# Patient Record
Sex: FEMALE | Race: WHITE | URBAN - METROPOLITAN AREA
[De-identification: names, ages, dates, MRNs, and addresses within clinical notes are randomized per-mention and may not be internally consistent; named-entity substitution may affect disease eponyms.]

---

## 2017-04-04 ENCOUNTER — HOSPITAL ENCOUNTER (INPATIENT)
Facility: CLINIC | Age: 77
Setting detail: SURGERY ADMIT
End: 2017-04-04
Attending: ORTHOPAEDIC SURGERY | Admitting: ORTHOPAEDIC SURGERY
Payer: MEDICARE

## 2017-05-01 RX ORDER — ALBUTEROL SULFATE 90 UG/1
2 AEROSOL, METERED RESPIRATORY (INHALATION) EVERY 4 HOURS PRN
COMMUNITY

## 2017-05-02 ENCOUNTER — APPOINTMENT (OUTPATIENT)
Dept: GENERAL RADIOLOGY | Facility: CLINIC | Age: 77
DRG: 472 | End: 2017-05-02
Attending: ORTHOPAEDIC SURGERY
Payer: MEDICARE

## 2017-05-02 ENCOUNTER — ANESTHESIA EVENT (OUTPATIENT)
Dept: SURGERY | Facility: CLINIC | Age: 77
DRG: 472 | End: 2017-05-02
Payer: MEDICARE

## 2017-05-02 ENCOUNTER — ANESTHESIA (OUTPATIENT)
Dept: SURGERY | Facility: CLINIC | Age: 77
DRG: 472 | End: 2017-05-02
Payer: MEDICARE

## 2017-05-02 ENCOUNTER — HOSPITAL ENCOUNTER (INPATIENT)
Facility: CLINIC | Age: 77
LOS: 4 days | Discharge: NURSING, REHAB OR LONG-TERM CARE | DRG: 472 | End: 2017-05-06
Attending: ORTHOPAEDIC SURGERY | Admitting: ORTHOPAEDIC SURGERY
Payer: MEDICARE

## 2017-05-02 DIAGNOSIS — L40.9 PSORIASIS: ICD-10-CM

## 2017-05-02 DIAGNOSIS — M50.00 CERVICAL DISC DISEASE WITH MYELOPATHY: Primary | ICD-10-CM

## 2017-05-02 DIAGNOSIS — F41.1 GAD (GENERALIZED ANXIETY DISORDER): ICD-10-CM

## 2017-05-02 LAB
ABO + RH BLD: NORMAL
ABO + RH BLD: NORMAL
BLD GP AB SCN SERPL QL: NORMAL
BLOOD BANK CMNT PATIENT-IMP: NORMAL
DEPRECATED CALCIDIOL+CALCIFEROL SERPL-MC: 45 UG/L (ref 20–75)
GLUCOSE BLDC GLUCOMTR-MCNC: 139 MG/DL (ref 70–99)
GLUCOSE BLDC GLUCOMTR-MCNC: 159 MG/DL (ref 70–99)
GLUCOSE BLDC GLUCOMTR-MCNC: 176 MG/DL (ref 70–99)
GLUCOSE BLDC GLUCOMTR-MCNC: 178 MG/DL (ref 70–99)
GLUCOSE SERPL-MCNC: 164 MG/DL (ref 70–99)
POTASSIUM SERPL-SCNC: 4.2 MMOL/L (ref 3.4–5.3)
SPECIMEN EXP DATE BLD: NORMAL

## 2017-05-02 PROCEDURE — 0RG20A0 FUSION OF 2 OR MORE CERVICAL VERTEBRAL JOINTS WITH INTERBODY FUSION DEVICE, ANTERIOR APPROACH, ANTERIOR COLUMN, OPEN APPROACH: ICD-10-PCS | Performed by: ORTHOPAEDIC SURGERY

## 2017-05-02 PROCEDURE — 86900 BLOOD TYPING SEROLOGIC ABO: CPT | Performed by: ORTHOPAEDIC SURGERY

## 2017-05-02 PROCEDURE — 25000128 H RX IP 250 OP 636: Performed by: ANESTHESIOLOGY

## 2017-05-02 PROCEDURE — 36000069 ZZH SURGERY LEVEL 5 EA 15 ADDTL MIN: Performed by: ORTHOPAEDIC SURGERY

## 2017-05-02 PROCEDURE — 36000067 ZZH SURGERY LEVEL 5 1ST 30 MIN: Performed by: ORTHOPAEDIC SURGERY

## 2017-05-02 PROCEDURE — 25000128 H RX IP 250 OP 636: Performed by: NURSE ANESTHETIST, CERTIFIED REGISTERED

## 2017-05-02 PROCEDURE — 94660 CPAP INITIATION&MGMT: CPT

## 2017-05-02 PROCEDURE — 25000125 ZZHC RX 250: Performed by: NURSE ANESTHETIST, CERTIFIED REGISTERED

## 2017-05-02 PROCEDURE — 25800025 ZZH RX 258: Performed by: NURSE ANESTHETIST, CERTIFIED REGISTERED

## 2017-05-02 PROCEDURE — 00NW0ZZ RELEASE CERVICAL SPINAL CORD, OPEN APPROACH: ICD-10-PCS | Performed by: ORTHOPAEDIC SURGERY

## 2017-05-02 PROCEDURE — 40000275 ZZH STATISTIC RCP TIME EA 10 MIN

## 2017-05-02 PROCEDURE — 12000007 ZZH R&B INTERMEDIATE

## 2017-05-02 PROCEDURE — 25000125 ZZHC RX 250: Performed by: ANESTHESIOLOGY

## 2017-05-02 PROCEDURE — 27210995 ZZH RX 272: Performed by: ORTHOPAEDIC SURGERY

## 2017-05-02 PROCEDURE — 25000125 ZZHC RX 250: Performed by: ORTHOPAEDIC SURGERY

## 2017-05-02 PROCEDURE — A9270 NON-COVERED ITEM OR SERVICE: HCPCS | Mod: GY | Performed by: ORTHOPAEDIC SURGERY

## 2017-05-02 PROCEDURE — 25000566 ZZH SEVOFLURANE, EA 15 MIN: Performed by: ORTHOPAEDIC SURGERY

## 2017-05-02 PROCEDURE — 36415 COLL VENOUS BLD VENIPUNCTURE: CPT | Performed by: ORTHOPAEDIC SURGERY

## 2017-05-02 PROCEDURE — 25000128 H RX IP 250 OP 636: Performed by: ORTHOPAEDIC SURGERY

## 2017-05-02 PROCEDURE — 71000013 ZZH RECOVERY PHASE 1 LEVEL 1 EA ADDTL HR: Performed by: ORTHOPAEDIC SURGERY

## 2017-05-02 PROCEDURE — 27210794 ZZH OR GENERAL SUPPLY STERILE: Performed by: ORTHOPAEDIC SURGERY

## 2017-05-02 PROCEDURE — 37000008 ZZH ANESTHESIA TECHNICAL FEE, 1ST 30 MIN: Performed by: ORTHOPAEDIC SURGERY

## 2017-05-02 PROCEDURE — 37000009 ZZH ANESTHESIA TECHNICAL FEE, EACH ADDTL 15 MIN: Performed by: ORTHOPAEDIC SURGERY

## 2017-05-02 PROCEDURE — 82306 VITAMIN D 25 HYDROXY: CPT | Performed by: ORTHOPAEDIC SURGERY

## 2017-05-02 PROCEDURE — 40000985 XR CERVICAL SPINE PORT 1 VW

## 2017-05-02 PROCEDURE — 25800025 ZZH RX 258: Performed by: ANESTHESIOLOGY

## 2017-05-02 PROCEDURE — 84132 ASSAY OF SERUM POTASSIUM: CPT | Performed by: ORTHOPAEDIC SURGERY

## 2017-05-02 PROCEDURE — 82947 ASSAY GLUCOSE BLOOD QUANT: CPT | Performed by: ORTHOPAEDIC SURGERY

## 2017-05-02 PROCEDURE — 99207 ZZC CONSULT E&M CHANGED TO INITIAL LEVEL: CPT | Performed by: PHYSICIAN ASSISTANT

## 2017-05-02 PROCEDURE — 99223 1ST HOSP IP/OBS HIGH 75: CPT | Performed by: PHYSICIAN ASSISTANT

## 2017-05-02 PROCEDURE — C1713 ANCHOR/SCREW BN/BN,TIS/BN: HCPCS | Performed by: ORTHOPAEDIC SURGERY

## 2017-05-02 PROCEDURE — 25000132 ZZH RX MED GY IP 250 OP 250 PS 637: Mod: GY | Performed by: ORTHOPAEDIC SURGERY

## 2017-05-02 PROCEDURE — 27810169 ZZH OR IMPLANT GENERAL: Performed by: ORTHOPAEDIC SURGERY

## 2017-05-02 PROCEDURE — 00000146 ZZHCL STATISTIC GLUCOSE BY METER IP

## 2017-05-02 PROCEDURE — 71000012 ZZH RECOVERY PHASE 1 LEVEL 1 FIRST HR: Performed by: ORTHOPAEDIC SURGERY

## 2017-05-02 PROCEDURE — C9399 UNCLASSIFIED DRUGS OR BIOLOG: HCPCS | Performed by: NURSE ANESTHETIST, CERTIFIED REGISTERED

## 2017-05-02 PROCEDURE — 86901 BLOOD TYPING SEROLOGIC RH(D): CPT | Performed by: ORTHOPAEDIC SURGERY

## 2017-05-02 PROCEDURE — 40000170 ZZH STATISTIC PRE-PROCEDURE ASSESSMENT II: Performed by: ORTHOPAEDIC SURGERY

## 2017-05-02 PROCEDURE — 86850 RBC ANTIBODY SCREEN: CPT | Performed by: ORTHOPAEDIC SURGERY

## 2017-05-02 DEVICE — IMPLANTABLE DEVICE: Type: IMPLANTABLE DEVICE | Site: SPINE CERVICAL | Status: FUNCTIONAL

## 2017-05-02 RX ORDER — SODIUM CHLORIDE, SODIUM LACTATE, POTASSIUM CHLORIDE, CALCIUM CHLORIDE 600; 310; 30; 20 MG/100ML; MG/100ML; MG/100ML; MG/100ML
INJECTION, SOLUTION INTRAVENOUS CONTINUOUS
Status: DISCONTINUED | OUTPATIENT
Start: 2017-05-02 | End: 2017-05-02 | Stop reason: HOSPADM

## 2017-05-02 RX ORDER — ALBUTEROL SULFATE 90 UG/1
2 AEROSOL, METERED RESPIRATORY (INHALATION) EVERY 4 HOURS PRN
Status: DISCONTINUED | OUTPATIENT
Start: 2017-05-02 | End: 2017-05-06 | Stop reason: HOSPADM

## 2017-05-02 RX ORDER — POLYETHYLENE GLYCOL 3350 17 G/17G
17 POWDER, FOR SOLUTION ORAL DAILY
Status: DISCONTINUED | OUTPATIENT
Start: 2017-05-02 | End: 2017-05-06 | Stop reason: HOSPADM

## 2017-05-02 RX ORDER — DEXTROSE MONOHYDRATE 25 G/50ML
25-50 INJECTION, SOLUTION INTRAVENOUS
Status: DISCONTINUED | OUTPATIENT
Start: 2017-05-02 | End: 2017-05-06 | Stop reason: HOSPADM

## 2017-05-02 RX ORDER — FENTANYL CITRATE 50 UG/ML
INJECTION, SOLUTION INTRAMUSCULAR; INTRAVENOUS PRN
Status: DISCONTINUED | OUTPATIENT
Start: 2017-05-02 | End: 2017-05-02

## 2017-05-02 RX ORDER — ONDANSETRON 2 MG/ML
4 INJECTION INTRAMUSCULAR; INTRAVENOUS EVERY 6 HOURS PRN
Status: DISCONTINUED | OUTPATIENT
Start: 2017-05-02 | End: 2017-05-06 | Stop reason: HOSPADM

## 2017-05-02 RX ORDER — CEFAZOLIN SODIUM 2 G/100ML
2 INJECTION, SOLUTION INTRAVENOUS
Status: COMPLETED | OUTPATIENT
Start: 2017-05-02 | End: 2017-05-02

## 2017-05-02 RX ORDER — POTASSIUM CHLORIDE 1500 MG/1
20 TABLET, EXTENDED RELEASE ORAL 2 TIMES DAILY WITH MEALS
Status: DISCONTINUED | OUTPATIENT
Start: 2017-05-02 | End: 2017-05-06 | Stop reason: HOSPADM

## 2017-05-02 RX ORDER — NICOTINE POLACRILEX 4 MG
15-30 LOZENGE BUCCAL
Status: DISCONTINUED | OUTPATIENT
Start: 2017-05-02 | End: 2017-05-06 | Stop reason: HOSPADM

## 2017-05-02 RX ORDER — METOPROLOL TARTRATE 25 MG/1
25 TABLET, FILM COATED ORAL 2 TIMES DAILY
Status: DISCONTINUED | OUTPATIENT
Start: 2017-05-02 | End: 2017-05-06 | Stop reason: HOSPADM

## 2017-05-02 RX ORDER — PRAMIPEXOLE DIHYDROCHLORIDE 0.25 MG/1
0.5 TABLET ORAL
Status: DISCONTINUED | OUTPATIENT
Start: 2017-05-02 | End: 2017-05-06 | Stop reason: HOSPADM

## 2017-05-02 RX ORDER — HYDRALAZINE HYDROCHLORIDE 50 MG/1
100 TABLET, FILM COATED ORAL 3 TIMES DAILY
Status: DISCONTINUED | OUTPATIENT
Start: 2017-05-02 | End: 2017-05-06 | Stop reason: HOSPADM

## 2017-05-02 RX ORDER — NEOSTIGMINE METHYLSULFATE 1 MG/ML
VIAL (ML) INJECTION PRN
Status: DISCONTINUED | OUTPATIENT
Start: 2017-05-02 | End: 2017-05-02

## 2017-05-02 RX ORDER — ONDANSETRON 4 MG/1
4 TABLET, ORALLY DISINTEGRATING ORAL EVERY 6 HOURS PRN
Status: DISCONTINUED | OUTPATIENT
Start: 2017-05-02 | End: 2017-05-06 | Stop reason: HOSPADM

## 2017-05-02 RX ORDER — HYDROMORPHONE HYDROCHLORIDE 1 MG/ML
.3-.5 INJECTION, SOLUTION INTRAMUSCULAR; INTRAVENOUS; SUBCUTANEOUS EVERY 5 MIN PRN
Status: DISCONTINUED | OUTPATIENT
Start: 2017-05-02 | End: 2017-05-02 | Stop reason: HOSPADM

## 2017-05-02 RX ORDER — LEVOTHYROXINE SODIUM 100 UG/1
100 TABLET ORAL DAILY
Status: DISCONTINUED | OUTPATIENT
Start: 2017-05-03 | End: 2017-05-06 | Stop reason: HOSPADM

## 2017-05-02 RX ORDER — TORSEMIDE 10 MG/1
40 TABLET ORAL
Status: DISCONTINUED | OUTPATIENT
Start: 2017-05-02 | End: 2017-05-05

## 2017-05-02 RX ORDER — PROPOFOL 10 MG/ML
INJECTION, EMULSION INTRAVENOUS PRN
Status: DISCONTINUED | OUTPATIENT
Start: 2017-05-02 | End: 2017-05-02

## 2017-05-02 RX ORDER — FEXOFENADINE HCL 180 MG/1
180 TABLET ORAL DAILY
Status: DISCONTINUED | OUTPATIENT
Start: 2017-05-02 | End: 2017-05-06 | Stop reason: HOSPADM

## 2017-05-02 RX ORDER — GLYCOPYRROLATE 0.2 MG/ML
INJECTION, SOLUTION INTRAMUSCULAR; INTRAVENOUS PRN
Status: DISCONTINUED | OUTPATIENT
Start: 2017-05-02 | End: 2017-05-02

## 2017-05-02 RX ORDER — GABAPENTIN 300 MG/1
300 CAPSULE ORAL ONCE
Status: DISCONTINUED | OUTPATIENT
Start: 2017-05-02 | End: 2017-05-02 | Stop reason: HOSPADM

## 2017-05-02 RX ORDER — ONDANSETRON 2 MG/ML
INJECTION INTRAMUSCULAR; INTRAVENOUS PRN
Status: DISCONTINUED | OUTPATIENT
Start: 2017-05-02 | End: 2017-05-02

## 2017-05-02 RX ORDER — ONDANSETRON 2 MG/ML
4 INJECTION INTRAMUSCULAR; INTRAVENOUS EVERY 30 MIN PRN
Status: DISCONTINUED | OUTPATIENT
Start: 2017-05-02 | End: 2017-05-02 | Stop reason: HOSPADM

## 2017-05-02 RX ORDER — FENTANYL CITRATE 50 UG/ML
50-100 INJECTION, SOLUTION INTRAMUSCULAR; INTRAVENOUS
Status: DISCONTINUED | OUTPATIENT
Start: 2017-05-02 | End: 2017-05-02 | Stop reason: HOSPADM

## 2017-05-02 RX ORDER — SODIUM CHLORIDE, SODIUM LACTATE, POTASSIUM CHLORIDE, CALCIUM CHLORIDE 600; 310; 30; 20 MG/100ML; MG/100ML; MG/100ML; MG/100ML
INJECTION, SOLUTION INTRAVENOUS CONTINUOUS PRN
Status: DISCONTINUED | OUTPATIENT
Start: 2017-05-02 | End: 2017-05-02

## 2017-05-02 RX ORDER — PRAVASTATIN SODIUM 20 MG
20 TABLET ORAL AT BEDTIME
Status: DISCONTINUED | OUTPATIENT
Start: 2017-05-02 | End: 2017-05-06 | Stop reason: HOSPADM

## 2017-05-02 RX ORDER — NALOXONE HYDROCHLORIDE 0.4 MG/ML
.1-.4 INJECTION, SOLUTION INTRAMUSCULAR; INTRAVENOUS; SUBCUTANEOUS
Status: DISCONTINUED | OUTPATIENT
Start: 2017-05-02 | End: 2017-05-06 | Stop reason: HOSPADM

## 2017-05-02 RX ORDER — IPRATROPIUM BROMIDE AND ALBUTEROL SULFATE 2.5; .5 MG/3ML; MG/3ML
3 SOLUTION RESPIRATORY (INHALATION) ONCE
Status: COMPLETED | OUTPATIENT
Start: 2017-05-02 | End: 2017-05-02

## 2017-05-02 RX ORDER — CEFAZOLIN SODIUM 1 G/3ML
1 INJECTION, POWDER, FOR SOLUTION INTRAMUSCULAR; INTRAVENOUS SEE ADMIN INSTRUCTIONS
Status: DISCONTINUED | OUTPATIENT
Start: 2017-05-02 | End: 2017-05-02 | Stop reason: HOSPADM

## 2017-05-02 RX ORDER — EPHEDRINE SULFATE 50 MG/ML
INJECTION, SOLUTION INTRAMUSCULAR; INTRAVENOUS; SUBCUTANEOUS PRN
Status: DISCONTINUED | OUTPATIENT
Start: 2017-05-02 | End: 2017-05-02

## 2017-05-02 RX ORDER — HYDROMORPHONE HYDROCHLORIDE 1 MG/ML
.3-.5 INJECTION, SOLUTION INTRAMUSCULAR; INTRAVENOUS; SUBCUTANEOUS
Status: DISCONTINUED | OUTPATIENT
Start: 2017-05-02 | End: 2017-05-03

## 2017-05-02 RX ORDER — HYDROMORPHONE HYDROCHLORIDE 1 MG/ML
INJECTION, SOLUTION INTRAMUSCULAR; INTRAVENOUS; SUBCUTANEOUS
Status: DISCONTINUED
Start: 2017-05-02 | End: 2017-05-02

## 2017-05-02 RX ORDER — LIDOCAINE HYDROCHLORIDE 20 MG/ML
INJECTION, SOLUTION INFILTRATION; PERINEURAL PRN
Status: DISCONTINUED | OUTPATIENT
Start: 2017-05-02 | End: 2017-05-02

## 2017-05-02 RX ORDER — ONDANSETRON 2 MG/ML
4 INJECTION INTRAMUSCULAR; INTRAVENOUS EVERY 30 MIN PRN
Status: DISCONTINUED | OUTPATIENT
Start: 2017-05-02 | End: 2017-05-02

## 2017-05-02 RX ORDER — LIDOCAINE 40 MG/G
CREAM TOPICAL
Status: DISCONTINUED | OUTPATIENT
Start: 2017-05-02 | End: 2017-05-06 | Stop reason: HOSPADM

## 2017-05-02 RX ORDER — VECURONIUM BROMIDE 1 MG/ML
INJECTION, POWDER, LYOPHILIZED, FOR SOLUTION INTRAVENOUS PRN
Status: DISCONTINUED | OUTPATIENT
Start: 2017-05-02 | End: 2017-05-02

## 2017-05-02 RX ORDER — ACETAMINOPHEN 325 MG/1
975 TABLET ORAL ONCE
Status: DISCONTINUED | OUTPATIENT
Start: 2017-05-02 | End: 2017-05-02 | Stop reason: HOSPADM

## 2017-05-02 RX ORDER — LORAZEPAM 0.5 MG/1
0.5 TABLET ORAL
Status: DISCONTINUED | OUTPATIENT
Start: 2017-05-02 | End: 2017-05-06 | Stop reason: HOSPADM

## 2017-05-02 RX ORDER — FENTANYL CITRATE 50 UG/ML
25-50 INJECTION, SOLUTION INTRAMUSCULAR; INTRAVENOUS
Status: DISCONTINUED | OUTPATIENT
Start: 2017-05-02 | End: 2017-05-02 | Stop reason: HOSPADM

## 2017-05-02 RX ORDER — SODIUM CHLORIDE 9 MG/ML
INJECTION, SOLUTION INTRAVENOUS CONTINUOUS
Status: DISCONTINUED | OUTPATIENT
Start: 2017-05-02 | End: 2017-05-05

## 2017-05-02 RX ORDER — CEFAZOLIN SODIUM 2 G/100ML
2 INJECTION, SOLUTION INTRAVENOUS EVERY 8 HOURS
Status: COMPLETED | OUTPATIENT
Start: 2017-05-02 | End: 2017-05-03

## 2017-05-02 RX ORDER — ONDANSETRON 4 MG/1
4 TABLET, ORALLY DISINTEGRATING ORAL EVERY 30 MIN PRN
Status: DISCONTINUED | OUTPATIENT
Start: 2017-05-02 | End: 2017-05-02 | Stop reason: HOSPADM

## 2017-05-02 RX ADMIN — IPRATROPIUM BROMIDE AND ALBUTEROL SULFATE 3 ML: .5; 3 SOLUTION RESPIRATORY (INHALATION) at 14:02

## 2017-05-02 RX ADMIN — MIDAZOLAM HYDROCHLORIDE 1 MG: 1 INJECTION, SOLUTION INTRAMUSCULAR; INTRAVENOUS at 08:45

## 2017-05-02 RX ADMIN — HYDROMORPHONE HYDROCHLORIDE 0.5 MG: 1 INJECTION, SOLUTION INTRAMUSCULAR; INTRAVENOUS; SUBCUTANEOUS at 19:41

## 2017-05-02 RX ADMIN — LIDOCAINE HYDROCHLORIDE 100 MG: 20 INJECTION, SOLUTION INFILTRATION; PERINEURAL at 08:54

## 2017-05-02 RX ADMIN — VECURONIUM BROMIDE 1 MG: 1 INJECTION, POWDER, LYOPHILIZED, FOR SOLUTION INTRAVENOUS at 10:24

## 2017-05-02 RX ADMIN — SODIUM CHLORIDE: 9 INJECTION, SOLUTION INTRAVENOUS at 18:36

## 2017-05-02 RX ADMIN — HYDROMORPHONE HYDROCHLORIDE 0.5 MG: 1 INJECTION, SOLUTION INTRAMUSCULAR; INTRAVENOUS; SUBCUTANEOUS at 13:10

## 2017-05-02 RX ADMIN — FENTANYL CITRATE 50 MCG: 50 INJECTION, SOLUTION INTRAMUSCULAR; INTRAVENOUS at 10:29

## 2017-05-02 RX ADMIN — PROPOFOL 200 MG: 10 INJECTION, EMULSION INTRAVENOUS at 08:54

## 2017-05-02 RX ADMIN — LORAZEPAM 0.5 MG: 0.5 TABLET ORAL at 21:23

## 2017-05-02 RX ADMIN — HYDRALAZINE HYDROCHLORIDE 100 MG: 50 TABLET ORAL at 21:23

## 2017-05-02 RX ADMIN — ONDANSETRON 4 MG: 2 INJECTION INTRAMUSCULAR; INTRAVENOUS at 12:19

## 2017-05-02 RX ADMIN — NEOSTIGMINE METHYLSULFATE 5 MG: 1 INJECTION INTRAMUSCULAR; INTRAVENOUS; SUBCUTANEOUS at 12:18

## 2017-05-02 RX ADMIN — CEFAZOLIN SODIUM 2 G: 2 INJECTION, SOLUTION INTRAVENOUS at 09:06

## 2017-05-02 RX ADMIN — SODIUM CHLORIDE, POTASSIUM CHLORIDE, SODIUM LACTATE AND CALCIUM CHLORIDE: 600; 310; 30; 20 INJECTION, SOLUTION INTRAVENOUS at 08:52

## 2017-05-02 RX ADMIN — GLYCOPYRROLATE 0.8 MG: 0.2 INJECTION, SOLUTION INTRAMUSCULAR; INTRAVENOUS at 12:18

## 2017-05-02 RX ADMIN — FENTANYL CITRATE 50 MCG: 50 INJECTION, SOLUTION INTRAMUSCULAR; INTRAVENOUS at 08:46

## 2017-05-02 RX ADMIN — VECURONIUM BROMIDE 2 MG: 1 INJECTION, POWDER, LYOPHILIZED, FOR SOLUTION INTRAVENOUS at 09:00

## 2017-05-02 RX ADMIN — VECURONIUM BROMIDE 2 MG: 1 INJECTION, POWDER, LYOPHILIZED, FOR SOLUTION INTRAVENOUS at 11:37

## 2017-05-02 RX ADMIN — MIDAZOLAM HYDROCHLORIDE 1 MG: 1 INJECTION, SOLUTION INTRAMUSCULAR; INTRAVENOUS at 08:41

## 2017-05-02 RX ADMIN — Medication 10 MG: at 09:09

## 2017-05-02 RX ADMIN — FENTANYL CITRATE 50 MCG: 50 INJECTION, SOLUTION INTRAMUSCULAR; INTRAVENOUS at 09:15

## 2017-05-02 RX ADMIN — VECURONIUM BROMIDE 2 MG: 1 INJECTION, POWDER, LYOPHILIZED, FOR SOLUTION INTRAVENOUS at 09:58

## 2017-05-02 RX ADMIN — FENTANYL CITRATE 50 MCG: 50 INJECTION, SOLUTION INTRAMUSCULAR; INTRAVENOUS at 08:42

## 2017-05-02 RX ADMIN — HYDROMORPHONE HYDROCHLORIDE 0.5 MG: 1 INJECTION, SOLUTION INTRAMUSCULAR; INTRAVENOUS; SUBCUTANEOUS at 17:11

## 2017-05-02 RX ADMIN — Medication 5 MG: at 12:11

## 2017-05-02 RX ADMIN — VECURONIUM BROMIDE 1 MG: 1 INJECTION, POWDER, LYOPHILIZED, FOR SOLUTION INTRAVENOUS at 10:56

## 2017-05-02 RX ADMIN — FENTANYL CITRATE 25 MCG: 50 INJECTION, SOLUTION INTRAMUSCULAR; INTRAVENOUS at 12:29

## 2017-05-02 RX ADMIN — SODIUM CHLORIDE, POTASSIUM CHLORIDE, SODIUM LACTATE AND CALCIUM CHLORIDE: 600; 310; 30; 20 INJECTION, SOLUTION INTRAVENOUS at 11:04

## 2017-05-02 RX ADMIN — ROCURONIUM BROMIDE 50 MG: 10 INJECTION INTRAVENOUS at 08:54

## 2017-05-02 RX ADMIN — CEFAZOLIN SODIUM 2 G: 2 INJECTION, SOLUTION INTRAVENOUS at 18:46

## 2017-05-02 RX ADMIN — Medication 5 MG: at 09:02

## 2017-05-02 RX ADMIN — HYDROMORPHONE HYDROCHLORIDE 0.5 MG: 1 INJECTION, SOLUTION INTRAMUSCULAR; INTRAVENOUS; SUBCUTANEOUS at 22:07

## 2017-05-02 RX ADMIN — SUGAMMADEX 200 MG: 100 INJECTION, SOLUTION INTRAVENOUS at 12:37

## 2017-05-02 RX ADMIN — SODIUM CHLORIDE, POTASSIUM CHLORIDE, SODIUM LACTATE AND CALCIUM CHLORIDE: 600; 310; 30; 20 INJECTION, SOLUTION INTRAVENOUS at 07:44

## 2017-05-02 NOTE — IP AVS SNAPSHOT
MRN:[id number]                      After Visit Summary   5/2/2017    Nevaeh Lopes    MRN: [id number]           Thank you!     Thank you for choosing [facility] for your care. Our goal is always to provide you with excellent care. Hearing back from our patients is one way we can continue to improve our services. Please take a few minutes to complete the written survey that you may receive in the mail after you visit with us. Thank you!        Patient Information     Date Of Birth          1940        Designated Caregiver       Most Recent Value    Caregiver    Will someone help with your care after discharge? no      About your hospital stay     You were admitted on:  May 2, 2017 You last received care in the:  [facility] [facility] [address] [facility]    You were discharged on:  May 6, 2017        Reason for your hospital stay       See discharge summary                  Who to Call     For medical emergencies, please call 911.  For non-urgent questions about your medical care, please call your primary care provider or clinic, [phone]  For questions related to your surgery, please call your surgery clinic        Attending Provider     Provider Specialty    Rambo Martin MD Orthopedics       Primary Care Provider Office Phone # Fax #    Bartolome Clark [phone]       [place] [address]        After Care Instructions     Activity - Ambulate in hallway       Every shift            Activity - Up with assistive device           Activity - Up with nursing assistance           Additional Discharge Instructions       Encourage use of her home CPAP machine with sleep.            Advance Diet as Tolerated       Snacks/Supplements Adult: Ensure Plus Adult Shake; Between Meals   Combination Diet Dysphagia Diet Level 2: Mechan Altered; Nectar Thickened Liquids (pre-thickened or use instant food thickener)            Fall  precautions           Follow Up (Transitional Care Management)       Follow up with primary care provider, Bartolome Clark, or TCU provider within 7 days for hospital follow- up.  The following labs/tests are recommended: BMP.  Echocardiogram recommended after discharge from TCU, prior to follow up with her primary care provider.            General info for SNF       Length of Stay Estimate: Short Term Care: Estimated # of Days <30  Condition at Discharge: Improving  Level of care:skilled   Rehabilitation Potential: Good  Admission H&P remains valid and up-to-date: Yes  Recent Chemotherapy: N/A  Use Nursing Home Standing Orders: Yes            Glucose monitor nursing POCT       Before meals and at bedtime            Mantoux instructions       Give two-step Mantoux (PPD) Per Facility Policy Yes            Wound care (specify)       Site:   Anterior neck  Instructions:  Keep wound dry for two more days. May remove steri strips in one week.                  Additional Services     Occupational Therapy Adult Consult       Evaluate and treat as clinically indicated.    Reason:  adls            Physical Therapy Adult Consult       Evaluate and treat as clinically indicated.    Reason:  Post op anterior cervical fusion for myelopathy. No neck exercises. rom of bilateral ues.            Speech Language Path Adult Consult       Evaluate and treat as clinically indicated.    Reason:  Anterior cervical surgery.                  Further instructions from your care team       [facility] at [facility]5 via son.   Encourage use of her CPAP machine at night.     INSTRUCTIONS FOR CERVICAL SURGICAL PATIENTS  KIMBERLY MARTIN MD--[facility]    POSTOPERATIVE INSTRUCTIONS (AFTER SURGERY):     PATIENTS WITH A SOFT COLLAR:    1. The collar must be worn at all times for the 6 weeks after your surgery until your first follow up appointment with Dr. Martin. You may only remove it to shower however, be sure to  avoid any excessive movement of your head and neck.      PATIENTS WITH A CUSTOM NECK BRACE:  1. The brace must be worn at all times for 3 months after surgery. In most cases you can remove it to shower. Dr. Martin will tell you if you cannot.     2. Check the straps on the brace daily to make sure the lines drawn on the straps are where they belong and the brace is fitting properly.    3. Regarding any questions, concerns or needed adjustments with the brace please call [facility], [phone], and speak directly to the orthotist that fit the brace. If you are having difficulty reaching them please call Sue at [phone].    4. This brace can be cumbersome to wear we encourage you to practice wearing it with all activities BEFORE your surgery. This will allow you to get used to how it will feel and make any modifications at home or work if need be.     INCISION/WOUND CARE                1.   If you are discharged from the hospital with a dressing over your incision you may remove and replace it 2 days after leaving the hospital.     2.   If you have sutures that dissolve, the incision must be covered when showering for 5 days after surgery. On the 6th day, you may take a shower normally without covering the wound. But do not scrub the wound.     3.  The small pieces of tape over your incision are called Steri-strips, they can be   removed when they are loose or after 2 weeks.                                                                           4.    If you have staples, your incision must remain dry and covered until they are removed 2 weeks after your surgery. Please call Dr. Mario Rogers s Care Coordinator at [phone] to make an appointment to have these removed when you have returned home from the hospital.    5. Please look at your incisions daily and call [phone] immediately if you notice any redness, swelling, drainage, or if you develop a fever greater than 101.  If after hours or weekends call [phone] and speak to the on-call physician.    6. Absolutely no bathtubs, hot tubs, swimming in pools or lakes, or any submersion/soaking before the incision is completely healed (no open areas or scabs are present).    7. Due to the location of the incision and surgery you may experience swelling that can alter your swallowing if you are having any difficulty at all please contact us immediately [phone] or after hours or weekends call [phone] and speak to the on-call physician.    POST OPERATIVE ACTIVITY LIMITATIONS    1. Avoid extreme motions of your head and neck which will be limited/controlled by the collar. Prior to surgery we encourage you to stock straws for drinking and soft foods to eat post-operatively as eating/drinking will be temporarily effected by both post surgical swelling and the collar.     2. No lifting over 10 pounds (gallon of milk) above your chest or below your waist.     3. Avoid repetitive twisting or bending i.e. raking, sweeping, shoveling etc.    4. Walking stimulates the healing process. Dr. Martin wants you to accomplish a minimum of 45 minutes of sustained walking per day for exercise. You are encouraged to walk several times a day and there is no limit on how far you can walk. In the beginning you may only be able to walk 5-15 minutes at a time that is okay just do this a minimum of 4-10x/day.    5. You may remove your white stockings (todd hose) for   hour twice a day. You may discontinue wearing the stockings when you are not spending any time in bed during the day and are walking at least hourly.    6. You may begin driving again when you are no longer taking the narcotic pain medication and feel comfortable with being able to navigate amongst other drivers. You must also wear the collar that you have been provided while driving.    7. You may return to work when you are no longer taking the narcotic pain medication. You must observe  the 10-pound lifting restriction (nothing below waist or above chest), frequent change of position from sit, stand, and walking and avoid repetitive bending and twisting. You must also wear the collar that you have been provided.    8. Advancement of physical activities will be discussed at each follow up appointment with Dr. Martin. Physical therapy, if needed, will also be discussed at each appointment.    9. Gentle sexual activity is okay. Be a passive participant.    POST OPERATIVE MEDICATIONS    1. If your surgery INCLUDED A FUSION  DO NOT take Ibuprofen, Motrin, Advil, Aleve or any other anti-inflammatory medication or aspirin products for 3 months after surgery. This also includes any medication taken for chronic inflammatory conditions i.e. Methotrexate, Remicaid, Prednisone etc. unless otherwise instructed. Only Tylenol or Tylenol based medications are okay during this time frame.    2. If your surgery DID NOT include a fusion you may resume any over-the-counter     anti-inflammatories (Advil, Ibuprofen, Naproxen etc.) 3-5 days after the surgery.    3. We recommend three 8 ounce glasses of milk daily and a daily supplement of Vitamin D 2000 i.u./day for fusion healing and bone growth.    4. Poor nutrition can lead to delayed wound healing and constipation. Good sources of lean proteins and fresh fruits and vegetables will help with this.     5. Narcotic pain medications will also cause constipation. Using over the counter stool softeners, drinking plenty of fluids, ambulation, and good nutrition can help prevent this from occurring.     If you have any questions regarding these instructions please contact Dr. Martin at   [phone].        Pending Results     No orders found from 4/30/2017 to 5/3/2017.            Statement of Approval     Ordered          05/06/17 1233  I have reviewed and agree with all the recommendations and orders detailed in this document.  EFFECTIVE NOW     Approved and electronically  "signed by:  Amy Terrell MD           17 1217  I have reviewed and agree with all the recommendations and orders detailed in this document.  EFFECTIVE NOW     Approved and electronically signed by:  Amy Terrell MD           17 0818  I have reviewed and agree with all the recommendations and orders detailed in this document.  EFFECTIVE NOW     Approved and electronically signed by:  Rambo Martin MD             Admission Information     Date & Time Provider Department Dept. Phone    2017 Rambo Martin MD [place] [address] [facility] [phone]      Your Vitals Were     Blood Pressure Temperature Respirations Height Weight Pulse Oximetry    156/55 (BP Location: Left arm) 98.3  F (36.8  C) (Oral) 16 1.6 m (5' 3\") 95.4 kg (210 lb 5.1 oz) 96%    BMI (Body Mass Index)                   37.26 kg/m2           MyChart Information     [employer] lets you send messages to your doctor, view your test results, renew your prescriptions, schedule appointments and more. To sign up, go to [email or web address] . Click on \"Log in\" on the left side of the screen, which will take you to the Welcome page. Then click on \"Sign up Now\" on the right side of the page.     You will be asked to enter the access code listed below, as well as some personal information. Please follow the directions to create your username and password.     Your access code is: [email or web address]  Expires: 8/3/2017  8:59 AM     Your access code will  in 90 days. If you need help or a new code, please call your [facility] clinic or [phone].        Care EveryWhere ID     This is your Care EveryWhere ID. This could be used by other organizations to access your [facility] medical records  [id number]           Review of your medicines      START taking        Dose / Directions    clobetasol 0.05 % cream   Commonly known as:  TEMOVATE   Used for:  Psoriasis        Apply topically 2 times daily   Refills:  0    "    HYDROmorphone 2 MG tablet   Commonly known as:  DILAUDID        Dose:  1-2 mg   Take 0.5-1 tablets (1-2 mg) by mouth every 4 hours as needed for moderate to severe pain   Quantity:  30 tablet   Refills:  0         CONTINUE these medicines which may have CHANGED, or have new prescriptions. If we are uncertain of the size of tablets/capsules you have at home, strength may be listed as something that might have changed.        Dose / Directions    * ATIVAN PO   This may have changed:  Another medication with the same name was changed. Make sure you understand how and when to take each.        Dose:  0.5 mg   Take 0.5 mg by mouth nightly as needed for anxiety   Refills:  0       * LORazepam 0.5 MG tablet   Commonly known as:  ATIVAN   This may have changed:    - medication strength  - when to take this  - reasons to take this   Used for:  JOSE (generalized anxiety disorder)        Dose:  0.5 mg   Take 1 tablet (0.5 mg) by mouth daily as needed   Quantity:  20 tablet   Refills:  0       * Notice:  This list has 2 medication(s) that are the same as other medications prescribed for you. Read the directions carefully, and ask your doctor or other care provider to review them with you.      CONTINUE these medicines which have NOT CHANGED        Dose / Directions    albuterol 108 (90 BASE) MCG/ACT Inhaler   Commonly known as:  PROAIR HFA/PROVENTIL HFA/VENTOLIN HFA        Dose:  2 puff   Inhale 2 puffs into the lungs every 4 hours as needed for shortness of breath / dyspnea or wheezing   Refills:  0       ALLEGRA PO        Dose:  180 mg   Take 180 mg by mouth daily   Refills:  0       ASPIRIN PO        Dose:  325 mg   Take 325 mg by mouth At Bedtime   Refills:  0       HYDRALAZINE HCL PO        Dose:  100 mg   Take 100 mg by mouth 3 times daily   Refills:  0       IMODIUM A-D PO        Dose:  2 mg   Take 2 mg by mouth every 4 hours as needed   Refills:  0       insulin glargine 100 UNIT/ML injection   Commonly known as:   LANTUS        Dose:  35 Units   Inject 35 Units Subcutaneous every morning   Refills:  0       K-DUR PO        Dose:  20 mEq   Take 20 mEq by mouth 2 times daily (Takes 2 x 10mEq = 20mEq dose)   Refills:  0       LEVOTHYROXINE SODIUM PO        Dose:  100 mcg   Take 100 mcg by mouth daily   Refills:  0       MELATONIN PO        Dose:  10 mg   Take 10 mg by mouth At Bedtime (takes 2 x 5mg tablet = 10mg dose)   Refills:  0       METOPROLOL TARTRATE PO        Dose:  25 mg   Take 25 mg by mouth 2 times daily   Refills:  0       MIRALAX PO        Dose:  17 g   Take 17 g by mouth daily as needed   Refills:  0       MIRAPEX PO        Dose:  0.5 mg   Take 0.5 mg by mouth nightly as needed (restless legs)   Refills:  0       NONFORMULARY        Dose:  1-2 capsule   Take 1-2 capsules by mouth 3 times daily as needed (Irritable Bowel Syndrome) OTC: IBgard   Refills:  0       NovoLOG FLEXPEN 100 UNIT/ML injection   Generic drug:  insulin aspart        Dose:  7-12 Units   Inject 7-12 Units Subcutaneous 3 times daily (with meals) If BG is over 170 - sliding scale   Refills:  0       OMEPRAZOLE PO        Dose:  20 mg   Take 20 mg by mouth daily   Refills:  0       PRAVASTATIN SODIUM PO        Dose:  20 mg   Take 20 mg by mouth At Bedtime   Refills:  0       * PROZAC PO        Dose:  40 mg   Take 40 mg by mouth every other day On Even Days   Refills:  0       * PROZAC PO        Dose:  80 mg   Take 80 mg by mouth every other day On Odd Days (Takes 2 x 40mg tablet = 80mg dose)   Refills:  0       TORSEMIDE PO        Dose:  40 mg   Take 40 mg by mouth 2 times daily (Takes 2 x 20mg tablet = 40mg dose)   Refills:  0       * TYLENOL PO        Dose:  1300 mg   Take 1,300 mg by mouth daily as needed for mild pain or fever   Refills:  0       * TYLENOL PO        Dose:  1300 mg   Take 1,300 mg by mouth 2 times daily   Refills:  0       VITAMIN D (CHOLECALCIFEROL) PO        Dose:  5000 Units   Take 5,000 Units by mouth daily (takes 5 x 1000  unit tablet = 5000unit dose)   Refills:  0       * Notice:  This list has 4 medication(s) that are the same as other medications prescribed for you. Read the directions carefully, and ask your doctor or other care provider to review them with you.      STOP taking     acetaminophen-codeine 300-30 MG per tablet   Commonly known as:  TYLENOL #3                Where to get your medicines      Some of these will need a paper prescription and others can be bought over the counter. Ask your nurse if you have questions.     Bring a paper prescription for each of these medications     HYDROmorphone 2 MG tablet       You don't need a prescription for these medications     clobetasol 0.05 % cream         Information about where to get these medications is not yet available     ! Ask your nurse or doctor about these medications     LORazepam 0.5 MG tablet                Protect others around you: Learn how to safely use, store and throw away your medicines at www.disposemymeds.org.             Medication List: This is a list of all your medications and when to take them. Check marks below indicate your daily home schedule. Keep this list as a reference.      Medications           Morning Afternoon Evening Bedtime As Needed    albuterol 108 (90 BASE) MCG/ACT Inhaler   Commonly known as:  PROAIR HFA/PROVENTIL HFA/VENTOLIN HFA   Inhale 2 puffs into the lungs every 4 hours as needed for shortness of breath / dyspnea or wheezing                                ALLEGRA PO   Take 180 mg by mouth daily   Last time this was given:  180 mg on 5/6/2017  9:29 AM                                ASPIRIN PO   Take 325 mg by mouth At Bedtime                                * ATIVAN PO   Take 0.5 mg by mouth nightly as needed for anxiety   Last time this was given:  0.5 mg on 5/6/2017  5:24 AM                                * LORazepam 0.5 MG tablet   Commonly known as:  ATIVAN   Take 1 tablet (0.5 mg) by mouth daily as needed   Last time this  was given:  0.5 mg on 5/6/2017  5:24 AM                                clobetasol 0.05 % cream   Commonly known as:  TEMOVATE   Apply topically 2 times daily                                HYDRALAZINE HCL PO   Take 100 mg by mouth 3 times daily   Last time this was given:  100 mg on 5/6/2017  3:49 PM                                HYDROmorphone 2 MG tablet   Commonly known as:  DILAUDID   Take 0.5-1 tablets (1-2 mg) by mouth every 4 hours as needed for moderate to severe pain   Last time this was given:  1 mg on 5/6/2017  3:49 PM                                IMODIUM A-D PO   Take 2 mg by mouth every 4 hours as needed                                insulin glargine 100 UNIT/ML injection   Commonly known as:  LANTUS   Inject 35 Units Subcutaneous every morning   Last time this was given:  35 Units on 5/6/2017  9:59 AM                                K-DUR PO   Take 20 mEq by mouth 2 times daily (Takes 2 x 10mEq = 20mEq dose)   Last time this was given:  20 mEq on 5/6/2017  9:28 AM                                LEVOTHYROXINE SODIUM PO   Take 100 mcg by mouth daily   Last time this was given:  100 mcg on 5/6/2017  9:28 AM                                MELATONIN PO   Take 10 mg by mouth At Bedtime (takes 2 x 5mg tablet = 10mg dose)                                METOPROLOL TARTRATE PO   Take 25 mg by mouth 2 times daily   Last time this was given:  25 mg on 5/6/2017  9:28 AM                                MIRALAX PO   Take 17 g by mouth daily as needed   Last time this was given:  17 g on 5/6/2017  9:28 AM                                MIRAPEX PO   Take 0.5 mg by mouth nightly as needed (restless legs)                                NONFORMULARY   Take 1-2 capsules by mouth 3 times daily as needed (Irritable Bowel Syndrome) OTC: IBgard                                NovoLOG FLEXPEN 100 UNIT/ML injection   Inject 7-12 Units Subcutaneous 3 times daily (with meals) If BG is over 170 - sliding scale   Last time this was  given:  3 Units on 5/6/2017  9:59 AM   Generic drug:  insulin aspart                                OMEPRAZOLE PO   Take 20 mg by mouth daily   Last time this was given:  20 mg on 5/4/2017  8:55 AM                                PRAVASTATIN SODIUM PO   Take 20 mg by mouth At Bedtime   Last time this was given:  20 mg on 5/5/2017  9:02 PM                                * PROZAC PO   Take 40 mg by mouth every other day On Even Days   Last time this was given:  40 mg on 5/6/2017  9:29 AM                                * PROZAC PO   Take 80 mg by mouth every other day On Odd Days (Takes 2 x 40mg tablet = 80mg dose)   Last time this was given:  40 mg on 5/6/2017  9:29 AM                                TORSEMIDE PO   Take 40 mg by mouth 2 times daily (Takes 2 x 20mg tablet = 40mg dose)   Last time this was given:  40 mg on 5/5/2017  8:05 AM                                * TYLENOL PO   Take 1,300 mg by mouth daily as needed for mild pain or fever   Last time this was given:  650 mg on 5/6/2017  6:19 AM                                * TYLENOL PO   Take 1,300 mg by mouth 2 times daily   Last time this was given:  650 mg on 5/6/2017  6:19 AM                                VITAMIN D (CHOLECALCIFEROL) PO   Take 5,000 Units by mouth daily (takes 5 x 1000 unit tablet = 5000unit dose)                                * Notice:  This list has 6 medication(s) that are the same as other medications prescribed for you. Read the directions carefully, and ask your doctor or other care provider to review them with you.

## 2017-05-02 NOTE — IP AVS SNAPSHOT
` ` Patient Information     Patient Name Sex     Nevaeh Lopes ([id number]) Female 1940       Room Bed    705      Patient Demographics     Address Phone    [address]   [facility] [address] [phone] (Home) *Preferred*  [phone] (Mobile)      Patient Ethnicity & Race     Ethnic Group Patient Race    American White      Emergency Contact(s)     Name Relation Home Work Mobile    Ada Walker [phone] NONE [phone]    Raudel Lopes [phone] NONE [phone]      Documents on File        Status Date Received Description       Documents for the Patient    MARIAH Face Sheet Received () 09     Insurance Card  09     Other  09 medicare questions    Privacy Notice - [place]  09     External Medication Information Consent       [facility] Specified Other       Consent for Services/Privacy Notice - Hospital/Clinic Received 17     Patient ID Received 17     Insurance Card Received 17 MEDICARE    Insurance Card Received 17 BCBS    Insurance Card Received 17 BCBS RX    Consent for EHR Access Received 17     Privacy Notice - [place] Received 17     Consent for EHR Access  (Deleted)      Patient ID  (Deleted)         Documents for the Encounter    H/P - History and Physical  17 [facility]- HISTORY AND PHYSICAL 2017    CMS IM for Patient Signature Received 17 1MM    EKG Cardiac - HIM Scan  17 EKG- [facility]      Admission Information     Attending Provider Admitting Provider Admission Type Admission Date/Time    Rambo Martin MD Dick, Jeffrey Clayton, MD Elective 17  0610    Discharge Date Hospital Service Auth/Cert Status Service Area     Surgery Incomplete [facility]    Unit Room/Bed Admission Status       [facility]  Admission (Confirmed)       Admission     Complaint    CERVICAL SPINAL STENOSIS WITH  MYELOPATHY , Cervical disc disease with myelopathy      Hospital Account     Name Acct ID Class Status Primary Coverage    Marianne Nevaeh BALDWIN [id number] Inpatient Open MEDICARE - MEDICARE            Guarantor Account (for Hospital Account #[id number])     Name Relation to Pt Service Area Active? Acct Type    Nevaeh Lopes  FCS Yes Personal/Family    Address Phone          [address] [place] [address] [phone](H)              Coverage Information (for Hospital Account #[id number])     1. MEDICARE/MEDICARE     F/O Payor/Plan Precert #    MEDICARE/MEDICARE     Subscriber Subscriber #    MarianneNevaeh [id number]    Address Phone    [employer]  [address]  [place] [address] [phone]          2. [place]     F/O Payor/Plan Precert #    [place]     Subscriber Subscriber #    Marianne Nevaeh BALDWIN [id number]    Address Phone    [address] [address]  [place] [address] [phone]

## 2017-05-02 NOTE — OR NURSING
NIURKA Mcgowan at bedside to listen to lungs. NIURKA recommended to have patient cough as much as tolerated. Dr. Weaver also notified of 90% oxygen saturation on 4 Ls NC and or coarse lung sounds; DuoNeb ordered and given. Patient now 93-94% on 4 Ls NC. Lungs sound slightly less coarse. Will continue to monitor.

## 2017-05-02 NOTE — IP AVS SNAPSHOT
` [place] [address] [facility]: [phone]            Medication Administration Report for Nevaeh Lopes as of 05/06/17 1603   Legend:    Given Hold Not Given Due Canceled Entry Other Actions    Time Time (Time) Time  Time-Action       Inactive    Active    Linked        Medications 04/30/17 05/01/17 05/02/17 05/03/17 05/04/17 05/05/17 05/06/17    acetaminophen (TYLENOL) tablet 650 mg  Dose: 650 mg Freq: EVERY 4 HOURS PRN Route: PO  PRN Reason: mild pain  Start: 05/03/17 1357   Admin Instructions: Maximum acetaminophen dose from all sources = 75 mg/kg/day not to exceed 4 grams/day.          1540 (650 mg)-Given       2104 (650 mg)-Given        0619 (650 mg)-Given          Or  acetaminophen (TYLENOL) Suppository 650 mg  Dose: 650 mg Freq: EVERY 4 HOURS PRN Route: RE  PRN Reason: mild pain  Start: 05/03/17 1357   Admin Instructions: Maximum acetaminophen dose from all sources = 75 mg/kg/day not to exceed 4 grams/day.                                    albuterol (PROAIR HFA/PROVENTIL HFA/VENTOLIN HFA) Inhaler 2 puff  Dose: 2 puff Freq: EVERY 4 HOURS PRN Route: IN  PRN Reasons: shortness of breath / dyspnea,wheezing  Start: 05/02/17 1703   Admin Instructions: Patient will use own med from home.<br>               benzocaine-menthol (CHLORASEPTIC) 6-10 MG lozenge 1-2 lozenge  Dose: 1-2 lozenge Freq: EVERY 1 HOUR PRN Route: BU  PRN Reason: sore throat  Start: 05/02/17 1703   Admin Instructions: For sore throat without fever.        0615 (2 lozenge)-Given              bisacodyl (DULCOLAX) Suppository 10 mg  Dose: 10 mg Freq: DAILY PRN Route: RE  PRN Reason: constipation  Start: 05/04/17 1042        1316 (10 mg)-Given        1124 (10 mg)-Given            cholecalciferol (vitamin D3) capsule CAPS 5,000 Units  Dose: 5,000 Units Freq: DAILY Route: PO  Start: 05/02/17 1715      (1840)-Not Given        0926 (5,000 Units)-Given        0856 (5,000 Units)-Given        0805 (5,000 Units)-Given        0929  (5,000 Units)-Given           clobetasol (TEMOVATE) 0.05 % cream  Freq: 2 TIMES DAILY Route: Top  Start: 05/06/17 1215   Admin Instructions: Apply to patch of eczema on left arm until resolution.           (1410)-Not Given [C]       [ ] 2100           fexofenadine (ALLEGRA) tablet 180 mg  Dose: 180 mg Freq: DAILY Route: PO  Start: 05/02/17 1715      (1840)-Not Given        0926 (180 mg)-Given        0856 (180 mg)-Given        0805 (180 mg)-Given        0929 (180 mg)-Given           FLUoxetine (PROzac) capsule 40 mg  Dose: 40 mg Freq: EVERY OTHER DAY Route: PO  Start: 05/02/17 1715      (1840)-Not Given         0855 (40 mg)-Given         0929 (40 mg)-Given           FLUoxetine (PROzac) capsule 80 mg  Dose: 80 mg Freq: EVERY OTHER DAY Route: PO  Start: 05/03/17 0900       0926 (80 mg)-Given         0805 (80 mg)-Given            glucose 40 % gel 15-30 g  Dose: 15-30 g Freq: EVERY 15 MIN PRN Route: PO  PRN Reason: low blood sugar  Start: 05/02/17 1737   Admin Instructions: Give 15 g for BG 51 to 69 mg/dL IF patient is conscious and able to swallow. Give 30 g for BG less than or equal to 50 mg/dL IF patient is conscious and able to swallow. Do NOT give glucose gel via enteral tube.  IF patient has enteral tube: give apple juice 120 mL (4 oz or 15 g of CHO) via enteral tube for BG 51 to 69 mg/dL.  Give apple juice 240 mL (8 oz or 30 g of CHO) via enteral tube for BG less than or equal to 50 mg/dL.              Or  dextrose 50 % injection 25-50 mL  Dose: 25-50 mL Freq: EVERY 15 MIN PRN Route: IV  PRN Reason: low blood sugar  Start: 05/02/17 1737   Admin Instructions: Use if have IV access, BG less than 70 mg/dL and meet dose criteria below:  Dose if conscious and alert (or disorientated) and NPO = 25 mL  Dose if unconscious / not alert = 50 mL  Vesicant.              Or  glucagon injection 1 mg  Dose: 1 mg Freq: EVERY 15 MIN PRN Route: SC  PRN Reason: low blood sugar  PRN Comment: May repeat x 1 only  Start: 05/02/17 1732    Admin Instructions: May give SQ or IM. IF BG less than or equal to 50 mg/dL and no IV access.  ONLY use glucagon IF patient has NO IV access AND is UNABLE to swallow.               hydrALAZINE (APRESOLINE) tablet 100 mg  Dose: 100 mg Freq: 3 TIMES DAILY Route: PO  Start: 05/02/17 1715      (1841)-Not Given       2123 (100 mg)-Given        0926 (100 mg)-Given       1619 (100 mg)-Given       2303 (100 mg)-Given        0856 (100 mg)-Given       1655 (100 mg)-Given       2049 (100 mg)-Given               0805 (100 mg)-Given       1532 (100 mg)-Given       2102 (100 mg)-Given        0929 (100 mg)-Given       1549 (100 mg)-Given       [ ] 2200           HYDROmorphone (DILAUDID) half-tab 1-2 mg  Dose: 1-2 mg Freq: EVERY 3 HOURS PRN Route: PO  PRN Reason: moderate to severe pain  Start: 05/04/17 1032         0011 (1 mg)-Given       0440 (1 mg)-Given       0805 (1 mg)-Given       1124 (1 mg)-Given       1532 (1 mg)-Given       1833 (1 mg)-Given        0312 (1 mg)-Given       0929 (1 mg)-Given       1549 (1 mg)-Given           insulin aspart (NovoLOG) inj (RAPID ACTING)  Dose: 1-7 Units Freq: AT BEDTIME Route: SC  Start: 05/02/17 2200   Admin Instructions: HIGH INSULIN RESISTANCE DOSING    Do Not give Bedtime Correction Insulin if BG less than 200.   For  - 224 give 1 units.   For  - 249 give 2 units.   For  - 274 give 3 units.   For  - 299 give 4 units.   For  - 324 give 5 units.   For  - 349 give 6 units.   For BG greater than or equal to 350 give 7 units.   Notify provider if glucose greater than or equal to 350 mg/dL after administration of correction dose.  If given at mealtime, must be administered 5 min before meal or immediately after.        (0021)-Not Given       (2223)-Not Given        (2328)-Not Given        (2327)-Not Given [C]        [ ] 2200           insulin aspart (NovoLOG) inj (RAPID ACTING)  Dose: 1-10 Units Freq: 3 TIMES DAILY BEFORE MEALS Route: SC  Start: 05/02/17  1745   Admin Instructions: Correction Scale - HIGH INSULIN RESISTANCE DOSING     Do Not give Correction Insulin if Pre-Meal BG less than 140.   For Pre-Meal  - 164 give 1 unit.   For Pre-Meal  - 189 give 2 units.   For Pre-Meal  - 214 give 3 units.   For Pre-Meal  - 239 give 4 units.   For Pre-Meal  - 264 give 5 units.   For Pre-Meal  - 289 give 6 units.   For Pre-Meal  - 314 give 7 units.   For Pre-Meal  - 339 give 8 units.   For Pre-Meal  - 364 give 9 units.   For Pre-Meal BG greater than or equal to 365 give 10 units  To be given with prandial insulin, and based on pre-meal blood glucose.   Notify provider if glucose greater than or equal to 350 mg/dL after administration of correction dose.  If given at mealtime, must be administered 5 min before meal or immediately after.       (1841)-Not Given [C]        (0802)-Not Given [C]       1444 (2 Units)-Given [C]       (1745)-Not Given        (0757)-Not Given [C]       1306 (4 Units)-Given [C]       1708 (4 Units)-Given [C]        0804 (1 Units)-Given [C]       1412 (1 Units)-Given [C]       1832 (1 Units)-Given        0959 (3 Units)-Given [C]       (1410)-Not Given [C]       [ ] 1700           insulin glargine (LANTUS) injection 35 Units  Dose: 35 Units Freq: EVERY MORNING Route: SC  Start: 05/03/17 0900       0932 (35 Units)-Given        1048 (35 Units)-Given        1003 (35 Units)-Given        0959 (35 Units)-Given           levothyroxine (SYNTHROID/LEVOTHROID) tablet 100 mcg  Dose: 100 mcg Freq: DAILY Route: PO  Start: 05/03/17 0900       0926 (100 mcg)-Given        0856 (100 mcg)-Given        0805 (100 mcg)-Given        0928 (100 mcg)-Given           lidocaine (LMX4) cream  Freq: EVERY 1 HOUR PRN Route: Top  PRN Reason: pain  PRN Comment: with VAD insertion or accessing implanted port.  Start: 05/02/17 1703   Admin Instructions: Do NOT give if patient has a history of allergy to any local anesthetic or any  "\"audelia\" product.   Apply 30 minutes prior to VAD insertion or port access.  MAX Dose:  2.5 g (  of 5 g tube)               lidocaine 1 % 1 mL  Dose: 1 mL Freq: EVERY 1 HOUR PRN Route: OTHER  PRN Comment: mild pain with VAD insertion or accessing implanted port  Start: 05/02/17 1703   Admin Instructions: Do NOT give if patient has a history of allergy to any local anesthetic or any \"audelia\" product. MAX dose 1 mL subcutaneous OR intradermal in divided doses.               LORazepam (ATIVAN) tablet 0.5 mg  Dose: 0.5 mg Freq: DAILY PRN Route: PO  PRN Reason: anxiety  Start: 05/04/17 1045          0524 (0.5 mg)-Given           LORazepam (ATIVAN) tablet 0.5 mg  Dose: 0.5 mg Freq: AT BEDTIME PRN Route: PO  PRN Reason: anxiety  Start: 05/02/17 1703      2123 (0.5 mg)-Given        2303 (0.5 mg)-Given        2050 (0.5 mg)-Given        2107 (0.5 mg)-Given            melatonin tablet 10 mg  Dose: 10 mg Freq: AT BEDTIME PRN Route: PO  PRN Comment: sleep  Start: 05/02/17 1727   Admin Instructions: Changed to as needed per P & T protocol.               metoprolol (LOPRESSOR) tablet 25 mg  Dose: 25 mg Freq: 2 TIMES DAILY Route: PO  Start: 05/02/17 2100      (2133)-Not Given        0926 (25 mg)-Given       2053 (25 mg)-Given        0855 (25 mg)-Given       2050 (25 mg)-Given        0805 (25 mg)-Given       2102 (25 mg)-Given        0928 (25 mg)-Given       [ ] 2100           naloxone (NARCAN) injection 0.1-0.4 mg  Dose: 0.1-0.4 mg Freq: EVERY 2 MIN PRN Route: IV  PRN Reason: opioid reversal  Start: 05/02/17 1703   Admin Instructions: For respiratory rate LESS than or EQUAL to 8.  Partial reversal dose:  0.1 mg titrated q 2 minutes for Analgesia Side Effects Monitoring Sedation Level of 3 (frequently drowsy, arousable, drifts to sleep during conversation).Full reversal dose:  0.4 mg bolus for Analgesia Side Effects Monitoring Sedation Level of 4 (somnolent, minimal or no response to stimulation).               omeprazole (priLOSEC) " CR capsule 20 mg  Dose: 20 mg Freq: DAILY Route: PO  Start: 05/02/17 1715      (1841)-Not Given        0926 (20 mg)-Given        0855 (20 mg)-Given        (0805)-Not Given        (0930)-Not Given [C]           ondansetron (ZOFRAN-ODT) ODT tab 4 mg  Dose: 4 mg Freq: EVERY 6 HOURS PRN Route: PO  PRN Reason: nausea  Start: 05/02/17 2011   Admin Instructions: With dry hands, peel back foil backing and gently remove tablet; do not push oral disintegrating tablet through foil backing; administer immediately on tongue and oral disintegrating tablet dissolves in seconds; then swallow with saliva; liquid not required.              Or  ondansetron (ZOFRAN) injection 4 mg  Dose: 4 mg Freq: EVERY 6 HOURS PRN Route: IV  PRN Reasons: nausea,vomiting  Start: 05/02/17 2011   Admin Instructions: Irritant.               polyethylene glycol (MIRALAX/GLYCOLAX) Packet 17 g  Dose: 17 g Freq: DAILY Route: PO  Start: 05/02/17 1715   Admin Instructions: 1 Packet = 17 grams. Mixed prescribed dose in 8 ounces of water. Follow with 8 oz. of water.       (1842)-Not Given        0926 (17 g)-Given        (1042)-Not Given        0804 (17 g)-Given        0928 (17 g)-Given           potassium chloride SA (K-DUR/KLOR-CON M) CR tablet 20 mEq  Dose: 20 mEq Freq: 2 TIMES DAILY WITH MEALS Route: PO  Start: 05/02/17 1800   Admin Instructions: DO NOT CRUSH       (1842)-Not Given        0926 (20 mEq)-Given       1817 (20 mEq)-Given        0855 (20 mEq)-Given       1655 (20 mEq)-Given               0805 (20 mEq)-Given       1833 (20 mEq)-Given        0928 (20 mEq)-Given       [ ] 1800           pramipexole (MIRAPEX) tablet 0.5 mg  Dose: 0.5 mg Freq: AT BEDTIME PRN Route: PO  PRN Comment: restless legs  Start: 05/02/17 1703              pravastatin (PRAVACHOL) tablet 20 mg  Dose: 20 mg Freq: AT BEDTIME Route: PO  Start: 05/02/17 2200      (2133)-Not Given        2303 (20 mg)-Given        2050 (20 mg)-Given               2102 (20 mg)-Given        [ ] 2200            sodium chloride (PF) 0.9% PF flush 3 mL  Dose: 3 mL Freq: EVERY 8 HOURS Route: IK  Start: 05/02/17 2200   Admin Instructions: And Q1H PRN, to lock peripheral IV dormant line.       (2133)-Not Given        (0616)-Not Given       (1400)-Not Given       (2331)-Not Given        (0606)-Not Given       1655 (3 mL)-Given       2054 (3 mL)-Given               0438 (3 mL)-Given              1413 (3 mL)-Given       (2320)-Not Given        0930 (3 mL)-Given       (1410)-Not Given       [ ] 2200           sodium chloride (PF) 0.9% PF flush 3 mL  Dose: 3 mL Freq: EVERY 1 HOUR PRN Route: IK  PRN Reason: line flush  PRN Comment: for peripheral IV flush post IV meds  Start: 05/02/17 1703             Discontinued Medications  Medications 04/30/17 05/01/17 05/02/17 05/03/17 05/04/17 05/05/17 05/06/17         Rate: 75 mL/hr Freq: CONTINUOUS Route: IV  Start: 05/02/17 1800   End: 05/05/17 0425   Admin Instructions: Stop after current bag if tolerating fluids.       1836 ( )-New Bag        0609 ( )-Rate/Dose Verify       0922 ( )-New Bag       2206 ( )-New Bag        0000 ( )-Rate/Dose Verify       0600 ( )-Rate/Dose Verify        0425-Med Discontinued          Dose: 40 mg Freq: 2 TIMES DAILY. Route: IV  Start: 05/05/17 1145   End: 05/06/17 1214         1158 (40 mg)-Given       1532 (40 mg)-Given        0929 (40 mg)-Given       1214-Med Discontinued         Dose: 2-4 mg Freq: EVERY 3 HOURS PRN Route: PO  PRN Reason: moderate to severe pain  Start: 05/03/17 0707   End: 05/04/17 1032       0810 (2 mg)-Given       1116 (2 mg)-Given       1322 (2 mg)-Given       1747 (2 mg)-Given       2053 (2 mg)-Given       2303 (2 mg)-Given        0158 (4 mg)-Given       0515 (4 mg)-Given During Downtime       0856 (4 mg)-Given       1032-Med Discontinued           Dose: 0.5 mg Freq: DAILY Route: PO  Start: 05/03/17 1400   End: 05/04/17 1031       1442 (0.5 mg)-Given        0855 (0.5 mg)-Given       1031-Med Discontinued           Dose: 40 mg Freq:  2 TIMES DAILY. Route: PO  Start: 05/02/17 1745   End: 05/05/17 1133      (1842)-Not Given        0926 (40 mg)-Given       1618 (40 mg)-Given        0856 (40 mg)-Given       1655 (40 mg)-Given        0805 (40 mg)-Given       1133-Med Discontinued     Medications 04/30/17 05/01/17 05/02/17 05/03/17 05/04/17 05/05/17 05/06/17

## 2017-05-02 NOTE — IP AVS SNAPSHOT
"` `           [facility]: [phone]                                              INTERAGENCY TRANSFER FORM - NURSING   2017                    Hospital Admission Date: 2017  ALBINA SAXENA   : 1940  Sex: Female        Attending Provider: Rambo Martin MD     Allergies:  Adhesive Tape, Animal Dander, Dust Mites, Nsaids, Oxycodone, Sulfa Drugs, Clindamycin    Infection:  None   Service:  SURGERY    Ht:  1.6 m (5' 3\")   Wt:  95.4 kg (210 lb 5.1 oz)   Admission Wt:  96.2 kg (212 lb)    BMI:  37.26 kg/m 2   BSA:  2.06 m 2            Patient PCP Information     Provider PCP Type    Bartolome Clark MD General      Current Code Status     Date Active Code Status Order ID Comments User Context       Prior      Code Status History     Date Active Date Inactive Code Status Order ID Comments User Context    2017  8:18 AM  Full Code [id number]  Rambo Martin MD Outpatient    2017  5:03 PM 2017  8:18 AM Full Code [id number]  Rambo Martin MD Inpatient      Advance Directives        Does patient have a scanned Advance Directive/ACP document in EPIC?           No        Hospital Problems as of 2017              Priority Class Noted POA    Cervical disc disease with myelopathy Medium  2017 Yes      Non-Hospital Problems as of 2017     None      Immunizations     None         END      ASSESSMENT     Discharge Profile Flowsheet     EXPECTED DISCHARGE     Patient's communication style  spoken language (English or Bilingual) 17 0857    Expected Discharge Date  17 ([facility] at 1645) 17 1219   SKIN      DISCHARGE NEEDS ASSESSMENT     Inspection  Full 17 1046    Equipment Currently Used at Home  walker, rolling;raised toilet;shower chair;grab bar 17 0821   Procedural focused assessment (identify areas inspected)   Heel, left;Heel, right;Coccyx 17 1242    Transportation Available  family or friend will " "provide (pt's neighbors provide transportation) 05/03/17 0808   Skin WDL  ex 05/06/17 1046    GASTROINTESTINAL (ADULT,PEDIATRIC,OB)     Skin Integrity  incision(s);abrasion(s) 05/06/17 1046    GI WDL  WDL 05/06/17 1046   Additional Documentation  Rash (LDA) 05/02/17 0759    All Quadrants Bowel Sounds  hypoactive 05/05/17 2158   Skin areas NOT inspected  Buttock, right;Buttock, left;Coccyx;Sacrum 05/04/17 1843    Last Bowel Movement  05/05/17 05/06/17 1046   Skin Color/Characteristics  bruised (ecchymotic);pale 05/06/17 1046    GI Signs/Symptoms  constipation 05/05/17 1109   SAFETY      Passing flatus  yes 05/06/17 1046   Safety WDL  WDL 05/06/17 1413    COMMUNICATION ASSESSMENT                        Assessment WDL (Within Defined Limits) Definitions           Safety WDL     Effective: 09/28/15    Row Information: <b>WDL Definition:</b> Bed in low position, wheels locked; call light in reach; upper side rails up x 2; ID band on<br> <font color=\"gray\"><i>Item=AS safety wdl>>List=AS safety wdl>>Version=F14</i></font>      Skin WDL     Effective: 09/28/15    Row Information: <b>WDL Definition:</b> Warm; dry; intact; elastic; without discoloration; pressure points without redness<br> <font color=\"gray\"><i>Item=AS skin wdl>>List=AS skin wdl>>Version=F14</i></font>      Vitals     Vital Signs Flowsheet     VITAL SIGNS     Pain Intervention(s)  Medication (See eMAR) 05/06/17 1017    Temp  98.1  F (36.7  C) 05/06/17 1156   Response to Interventions  Decrease in pain 05/06/17 1016    Temp src  Oral 05/06/17 1156   Additional Documentation  -- (patient asleep) 05/02/17 1738    Resp  16 05/06/17 1156   ANALGESIA SIDE EFFECTS MONITORING      Heart Rate  62 05/06/17 1156   Side Effects Monitoring: Respiratory Quality  R 05/06/17 1016    Pulse/Heart Rate Source  Monitor 05/06/17 1156   Side Effects Monitoring: Respiratory Depth  N 05/06/17 1016    BP  159/68 05/06/17 1156   Side Effects Monitoring: Sedation Level  1 05/06/17 1016 " "   BP Location  Left arm 05/06/17 1156   HEIGHT AND WEIGHT      Patient Position  Sitting 05/02/17 0720   Height  1.6 m (5' 3\") 05/02/17 0720    OXYGEN THERAPY     Weight  95.4 kg (210 lb 5.1 oz) 05/06/17 0527    SpO2  95 % 05/06/17 1156   BSA (Calculated - sq m)  2.07 05/02/17 0720    O2 Device  None (Room air) 05/06/17 1156   BMI (Calculated)  37.63 05/02/17 0720    FiO2 (%)  50 % 05/02/17 1545   POSITIONING      Oxygen Delivery  1 LPM 05/06/17 0741   Body Position  log-rolled 05/05/17 0330    PAIN/COMFORT     Head of Bed (HOB)  HOB at 30 degrees 05/06/17 1046    Patient Currently in Pain  yes 05/06/17 1017   Positioning/Transfer Devices  pillows 05/05/17 0330    Preferred Pain Scale  number (Numeric Rating Pain Scale) 05/06/17 1017   Chair  Upright in chair 05/05/17 1109    Patient's Stated Pain Goal  5 05/02/17 0720   ECG      0-10 Pain Scale  4 05/06/17 1017   ECG Rhythm  Sinus rhythm 05/02/17 1643    Word Pain Scale  4 05/02/17 1705   DAILY CARE      Pain Location  Neck 05/06/17 1017   Activity Type  ambulated in cristobal 05/06/17 1046    Pain Orientation  Lower 05/06/17 1017   Activity Level of Assistance  assistance, 1 person 05/06/17 1046    Pain Descriptors  Nagging 05/06/17 1017   Activity Assistive Device  gait belt;walker 05/06/17 1046            Patient Lines/Drains/Airways Status    Active LINES/DRAINS/AIRWAYS     Name: Placement date: Placement time: Site: Days: Last dressing change:    Rash 05/02/17 0756 Right breast 05/02/17   0756    4     Incision/Surgical Site 05/02/17 Neck 05/02/17   1214    4             Patient Lines/Drains/Airways Status    Active PICC/CVC     None            Intake/Output Detail Report     Date Intake     Output     Net    Shift P.O. I.V. IV Piggyback Total Urine Drains Blood Total       Noc 05/04/17 2300 - 05/05/17 0659 -- -- -- -- -- -- -- -- 0    Day 05/05/17 0700 - 05/05/17 1459 75 -- -- 75 400 -- -- 400 -325    Melvina 05/05/17 1500 - 05/05/17 2259 240 -- -- 240 650 -- -- 650 " -410    Noc 05/05/17 2300 - 05/06/17 0659 480 -- -- 480 250 -- -- 250 230    Day 05/06/17 0700 - 05/06/17 1459 480 -- -- 480 1200 -- -- 1200 -720      Last Void/BM       Most Recent Value    Urine Occurrence 1 at 05/06/2017 0318    Stool Occurrence 1 at 05/05/2017 1200      Case Management/Discharge Planning     Case Management/Discharge Planning Flowsheet     REFERRAL INFORMATION     COPING/STRESS      Did the Initial Social Work Assessment result in a Social Work Case?  Yes 05/03/17 1256   Major Change/Loss/Stressor  surgery/procedure 05/02/17 0723    Admission Type  inpatient 05/03/17 1256   EXPECTED DISCHARGE      Arrived From  home or self-care 05/03/17 1256   Expected Discharge Date  05/06/17 ([facility] at 1645) 05/06/17 1219    Referral Source  interdisciplinary rounds;physician 05/03/17 1256   DISCHARGE PLANNING      Reason For Consult  discharge planning 05/03/17 1256   Transportation Available  family or friend will provide (pt's neighbors provide transportation) 05/03/17 0808    Record Reviewed  history and physical;medical record 05/03/17 1256   FINAL RESOURCES       Assigned to Case  Chapis Goldstein 05/03/17 1256   Equipment Currently Used at Home  walker, rolling;raised toilet;shower chair;grab bar 05/03/17 0821    LIVING ENVIRONMENT     ABUSE RISK SCREEN      Lives With  alone 05/03/17 1256   QUESTION TO PATIENT:  Has a member of your family or a partner(now or in the past) intimidated, hurt, manipulated, or controlled you in any way?  no 05/02/17 0712    Living Arrangements  independent living facility 05/03/17 1256   QUESTION TO PATIENT: Do you feel safe going back to the place where you are living?  yes 05/02/17 0712    ASSESSMENT OF FAMILY/SOCIAL SUPPORT     OBSERVATION: Is there reason to believe there has been maltreatment of a vulnerable adult (ie. Physical/Sexual/Emotional abuse, self neglect, lack of adequate food, shelter, medical care, or financial exploitation)?  no 05/02/17 0712     Marital Status   05/03/17 1256   (R) MENTAL HEALTH SUICIDE RISK      Who is your support system?  Children;Other (specify) (friend) 05/03/17 1256   Are you depressed or being treated for depression?  Yes (on PTA meds, see MAR) 05/04/17 6135    Description of Support System  Supportive 05/03/17 6683

## 2017-05-02 NOTE — IP AVS SNAPSHOT
"    [facility] [address] [facility]: [phone]                                              INTERAGENCY TRANSFER FORM - LAB / IMAGING / EKG / EMG RESULTS   2017                    Hospital Admission Date: 2017  ALBINA SAXENA   : 1940  Sex: Female        Attending Provider: Rambo Martin MD     Allergies:  Adhesive Tape, Animal Dander, Dust Mites, Nsaids, Oxycodone, Sulfa Drugs, Clindamycin    Infection:  None   Service:  SURGERY    Ht:  1.6 m (5' 3\")   Wt:  95.4 kg (210 lb 5.1 oz)   Admission Wt:  96.2 kg (212 lb)    BMI:  37.26 kg/m 2   BSA:  2.06 m 2            Patient PCP Information     Provider PCP Type    Bartolome Clark MD General         Lab Results - 3 Days      Glucose by meter [[id number]] (Abnormal)  Resulted: 17 1416, Result status: Final result    Ordering provider: Rambo Martin MD  17 1405 Resulting lab: POINT OF CARE TEST, GLUCOSE    Specimen Information    Type Source Collected On     17 1405          Components       Value Reference Range Flag Lab   Glucose 118 70 - 99 mg/dL H 170            Glucose by meter [[id number]] (Abnormal)  Resulted: 17 0901, Result status: Final result    Ordering provider: Rambo Martin MD  17 0856 Resulting lab: POINT OF CARE TEST, GLUCOSE    Specimen Information    Type Source Collected On     17 0856          Components       Value Reference Range Flag Lab   Glucose 197 70 - 99 mg/dL H 170            Basic metabolic panel [[id number]] (Abnormal)  Resulted: 17 0723, Result status: Final result    Ordering provider: Amy Terrell MD  17 0000 Resulting lab: [facility]    Specimen Information    Type Source Collected On   Blood  17 0702          Components       Value Reference Range Flag Lab   Sodium 143 133 - 144 mmol/L  FrStHsLb   Potassium 3.7 3.4 - 5.3 mmol/L  FrStHsLb   Chloride 104 94 - 109 mmol/L  FrStHsLb   Carbon Dioxide 30 20 - 32 " mmol/L  FrStHsLb   Anion Gap 9 3 - 14 mmol/L  FrStHsLb   Glucose 227 70 - 99 mg/dL H FrStHsLb   Urea Nitrogen 28 7 - 30 mg/dL  FrStHsLb   Creatinine 1.07 0.52 - 1.04 mg/dL H FrStHsLb   GFR Estimate 50 >60 mL/min/1.7m2 L FrStHsLb   Comment:  Non  GFR Calc   GFR Estimate If Black 60 >60 mL/min/1.7m2 L FrStHsLb   Comment:  African American GFR Calc   Calcium 8.5 8.5 - 10.1 mg/dL  FrStHsLb            Glucose by meter [[id number]] (Abnormal)  Resulted: 05/05/17 2331, Result status: Final result    Ordering provider: Rambo Martin MD  05/05/17 2323 Resulting lab: POINT OF CARE TEST, GLUCOSE    Specimen Information    Type Source Collected On     05/05/17 2323          Components       Value Reference Range Flag Lab   Glucose 140 70 - 99 mg/dL H 170            Glucose by meter [[id number]] (Abnormal)  Resulted: 05/05/17 2206, Result status: Final result    Ordering provider: Rambo Martin MD  05/05/17 2203 Resulting lab: POINT OF CARE TEST, GLUCOSE    Specimen Information    Type Source Collected On     05/05/17 2203          Components       Value Reference Range Flag Lab   Glucose 136 70 - 99 mg/dL H 170            Glucose by meter [[id number]] (Abnormal)  Resulted: 05/05/17 1831, Result status: Final result    Ordering provider: Rambo Martin MD  05/05/17 1825 Resulting lab: POINT OF CARE TEST, GLUCOSE    Specimen Information    Type Source Collected On     05/05/17 1825          Components       Value Reference Range Flag Lab   Glucose 144 70 - 99 mg/dL H 170            Glucose by meter [[id number]] (Abnormal)  Resulted: 05/05/17 1241, Result status: Final result    Ordering provider: Rambo Martin MD  05/05/17 1233 Resulting lab: POINT OF CARE TEST, GLUCOSE    Specimen Information    Type Source Collected On     05/05/17 1233          Components       Value Reference Range Flag Lab   Glucose 162 70 - 99 mg/dL H 170            Basic metabolic panel [[id number]] (Abnormal)   Resulted: 05/05/17 0857, Result status: Final result    Ordering provider: Amy Terrell MD  05/05/17 0000 Resulting lab: [facility]    Specimen Information    Type Source Collected On   Blood  05/05/17 0810          Components       Value Reference Range Flag Lab   Sodium 143 133 - 144 mmol/L  FrStHsLb   Potassium 4.0 3.4 - 5.3 mmol/L  FrStHsLb   Chloride 105 94 - 109 mmol/L  FrStHsLb   Carbon Dioxide 29 20 - 32 mmol/L  FrStHsLb   Anion Gap 9 3 - 14 mmol/L  FrStHsLb   Glucose 160 70 - 99 mg/dL H FrStHsLb   Urea Nitrogen 27 7 - 30 mg/dL  FrStHsLb   Creatinine 1.10 0.52 - 1.04 mg/dL H FrStHsLb   GFR Estimate 48 >60 mL/min/1.7m2 L FrStHsLb   Comment:  Non  GFR Calc   GFR Estimate If Black 58 >60 mL/min/1.7m2 L FrStHsLb   Comment:  African American GFR Calc   Calcium 8.3 8.5 - 10.1 mg/dL L FrStHsLb            Glucose by meter [[id number]] (Abnormal)  Resulted: 05/05/17 0801, Result status: Final result    Ordering provider: Rambo Martin MD  05/05/17 0755 Resulting lab: POINT OF CARE TEST, GLUCOSE    Specimen Information    Type Source Collected On     05/05/17 0755          Components       Value Reference Range Flag Lab   Glucose 155 70 - 99 mg/dL H 170            Glucose by meter [[id number]] (Abnormal)  Resulted: 05/05/17 0221, Result status: Final result    Ordering provider: Rambo Martin MD  05/05/17 0215 Resulting lab: POINT OF CARE TEST, GLUCOSE    Specimen Information    Type Source Collected On     05/05/17 0215          Components       Value Reference Range Flag Lab   Glucose 158 70 - 99 mg/dL H 170            Glucose by meter [[id number]] (Abnormal)  Resulted: 05/04/17 2101, Result status: Final result    Ordering provider: Rambo Martin MD  05/04/17 2048 Resulting lab: POINT OF CARE TEST, GLUCOSE    Specimen Information    Type Source Collected On     05/04/17 2048          Components       Value Reference Range Flag Lab   Glucose 170 70 - 99  mg/dL H 170            Glucose by meter [[id number]] (Abnormal)  Resulted: 05/04/17 1712, Result status: Final result    Ordering provider: Rambo Martin MD  05/04/17 1707 Resulting lab: POINT OF CARE TEST, GLUCOSE    Specimen Information    Type Source Collected On     05/04/17 1707          Components       Value Reference Range Flag Lab   Glucose 234 70 - 99 mg/dL H 170            Glucose by meter [[id number]] (Abnormal)  Resulted: 05/04/17 1244, Result status: Final result    Ordering provider: Rambo Martin MD  05/04/17 1235 Resulting lab: POINT OF CARE TEST, GLUCOSE    Specimen Information    Type Source Collected On     05/04/17 1235          Components       Value Reference Range Flag Lab   Glucose 219 70 - 99 mg/dL H 170            Glucose by meter [[id number]] (Abnormal)  Resulted: 05/04/17 0726, Result status: Final result    Ordering provider: Rambo Martin MD  05/04/17 0716 Resulting lab: POINT OF CARE TEST, GLUCOSE    Specimen Information    Type Source Collected On     05/04/17 0716          Components       Value Reference Range Flag Lab   Glucose 104 70 - 99 mg/dL H 170            Glucose by meter [[id number]] (Abnormal)  Resulted: 05/04/17 0216, Result status: Final result    Ordering provider: Rambo Martin MD  05/04/17 0207 Resulting lab: POINT OF CARE TEST, GLUCOSE    Specimen Information    Type Source Collected On     05/04/17 0207          Components       Value Reference Range Flag Lab   Glucose 121 70 - 99 mg/dL H 170            Glucose by meter [[id number]] (Abnormal)  Resulted: 05/03/17 2352, Result status: Final result    Ordering provider: Rambo Martin MD  05/03/17 2101 Resulting lab: POINT OF CARE TEST, GLUCOSE    Specimen Information    Type Source Collected On     05/03/17 2101          Components       Value Reference Range Flag Lab   Glucose 109 70 - 99 mg/dL H 170            Glucose by meter [[id number]] (Abnormal)  Resulted: 05/03/17 1737,  Result status: Final result    Ordering provider: Rambo Martin MD  05/03/17 1732 Resulting lab: POINT OF CARE TEST, GLUCOSE    Specimen Information    Type Source Collected On     05/03/17 1732          Components       Value Reference Range Flag Lab   Glucose 113 70 - 99 mg/dL H 170            Glucose by meter [[id number]] (Abnormal)  Resulted: 05/03/17 1221, Result status: Final result    Ordering provider: Rambo Martin MD  05/03/17 1216 Resulting lab: POINT OF CARE TEST, GLUCOSE    Specimen Information    Type Source Collected On     05/03/17 1216          Components       Value Reference Range Flag Lab   Glucose 169 70 - 99 mg/dL H 170            Basic metabolic panel [[id number]] (Abnormal)  Resulted: 05/03/17 0956, Result status: Final result    Ordering provider: Rambo Martin MD  05/03/17 0001 Resulting lab: [facility]    Specimen Information    Type Source Collected On   Blood  05/03/17 0847          Components       Value Reference Range Flag Lab   Sodium 144 133 - 144 mmol/L  FrStHsLb   Potassium 4.6 3.4 - 5.3 mmol/L  FrStHsLb   Chloride 109 94 - 109 mmol/L  FrStHsLb   Carbon Dioxide 30 20 - 32 mmol/L  FrStHsLb   Anion Gap 5 3 - 14 mmol/L  FrStHsLb   Glucose 146 70 - 99 mg/dL H FrStHsLb   Urea Nitrogen 21 7 - 30 mg/dL  FrStHsLb   Creatinine 1.11 0.52 - 1.04 mg/dL H FrStHsLb   GFR Estimate 48 >60 mL/min/1.7m2 L FrStHsLb   GFR Estimate If Black 58 >60 mL/min/1.7m2 L FrStHsLb   Calcium 8.1 8.5 - 10.1 mg/dL L FrStHsLb            Hemoglobin [[id number]] (Abnormal)  Resulted: 05/03/17 0902, Result status: Final result    Ordering provider: Rambo Martin MD  05/03/17 0001 Resulting lab: [facility]    Specimen Information    Type Source Collected On   Blood  05/03/17 0847          Components       Value Reference Range Flag Lab   Hemoglobin 11.1 11.7 - 15.7 g/dL L FrStHsLb            Glucose by meter [[id number]] (Abnormal)  Resulted: 05/03/17  0807, Result status: Final result    Ordering provider: Rambo Martin MD  05/03/17 0802 Resulting lab: POINT OF CARE TEST, GLUCOSE    Specimen Information    Type Source Collected On     05/03/17 0802          Components       Value Reference Range Flag Lab   Glucose 134 70 - 99 mg/dL H 170            Glucose by meter [[id number]] (Abnormal)  Resulted: 05/03/17 0616, Result status: Final result    Ordering provider: Rambo Martin MD  05/03/17 0605 Resulting lab: POINT OF CARE TEST, GLUCOSE    Specimen Information    Type Source Collected On     05/03/17 0605          Components       Value Reference Range Flag Lab   Glucose 141 70 - 99 mg/dL H 170            Testing Performed By     Lab - Abbreviation Name Director Address Valid Date Range    14 - FrHsLb [facility] Unknown [address] [place] [address] 05/08/15 1057 - Present    170 - Unknown POINT OF CARE TEST, GLUCOSE Unknown Unknown 10/31/11 1114 - Present            Unresulted Labs (24h ago through future)    Start       Ordered    05/06/17 0600  Basic metabolic panel  DAILY,   Routine      05/05/17 1134         Imaging Results - 3 Days      XR Chest Port 1 View [[id number]]  Resulted: 05/05/17 1118, Result status: Final result    Ordering provider: Amy Terrell MD  05/05/17 1035 Resulted by: Joey Mauro MD    Performed: 05/05/17 1054 - 05/05/17 1108 Resulting lab: RADIOLOGY RESULTS    Narrative:       XR CHEST PORT 1 VW  5/5/2017 11:08 AM     HISTORY:  hypoxia, post-op    COMPARISON: None.    FINDINGS:  Cardiac silhouette is prominent. Mild pulmonary vascular  congestion. Mild patchy opacities in the lung bases. Bilateral  shoulder arthroplasties.      Impression:       IMPRESSION: Cardiomegaly, pulmonary vascular congestion. This could be  due to mild congestive failure or mild fluid overload.    SALLIE MAURO MD      Testing Performed By     Lab - Abbreviation Name Director Address Valid Date Range    104 -  Rad Rsl[facility] RADIOLOGY RESULTS Unknown Unknown 02/16/05 1553 - Present                Encounter-Level Documents:     There are no encounter-level documents.      Order-Level Documents - 05/02/2017:            Scan on 4/28/2017  7:02 AM by Outside, Provider : EKG- [facility] (below)

## 2017-05-02 NOTE — OR NURSING
Patient has a lot of numbness and tingling in her bilateral arms and hands. She also has spasms of nerve pain in all four extremities. Her left lower leg is especially sensitive to touch/movement.

## 2017-05-02 NOTE — PLAN OF CARE
Problem: Goal Outcome Summary  Goal: Goal Outcome Summary  Outcome: Improving  Patient alert x 4, c/o numbness hands bilateral left worse than right and left leg pain and swelling from recent left leg cellulitus,, arrived on unit 1700, lung sounds coarse but improving, patient given iv dilaudid for pain with relief, some nausea but improving, dressing anterior neck CDI. Continue to monitor.

## 2017-05-02 NOTE — IP AVS SNAPSHOT
` `     [place] [address] [facility]: [phone]                 INTERAGENCY TRANSFER FORM - NOTES (H&P, Discharge Summary, Consults, Procedures, Therapies)   2017                    Hospital Admission Date: 2017  ALBINA LOPES   : 1940  Sex: Female        Patient PCP Information     Provider PCP Type    Bartolome Clark MD General         History & Physicals      H&P signed by Stewart Shepherd at 2017  7:02 AM      Author:  Stewart Shepherd Service:  (none) Author Type:  Physician    Filed:  2017  7:02 AM Date of Service:  2017  7:01 AM Note Created:  2017  7:02 AM    Status:  Signed :  Stewart Shepherd (Physician)     Scan on 2017  7:02 AM by Joao Provider : [facility]- HISTORY [facility]YSICAL 2017          Revision History        User Key Date/Time User Provider Type Action    > [N/A] 2017  7:02 AM Joao Provider Physician Sign                     Discharge Summaries      Discharge Summaries by Amy Terrell MD at 2017 12:32 PM     Author:  Amy Terrell MD Service:  Hospitalist Author Type:  Physician    Filed:  2017 12:32 PM Date of Service:  2017 12:32 PM Note Created:  2017 12:17 PM    Status:  Signed :  Amy Terrell MD (Physician)         [facility]    Discharge Summary  Hospitalist    Please also see Discharge Summary by Surgical Team    Date of Admission:  2017  Date of Discharge:[MM1.1]  2017[MM1.2]  Discharging Provider: Amy Terrell  Date of Service (when I saw the patient):[MM1.1] 17[MM1.3]    History of Present Illness   Albina Lopes is a 76-year-old female with past medical history of diabetes mellitus type 2, hypertension, hyperlipidemia, obstructive sleep apnea, does not use CPAP, anxiety, depression and cervical spinal stenosis who underwent previously scheduled C4 through C6 anterior cervical fusion per Dr. Martin. Hospitalist Service was consulted  postoperatively for co-medical management.     Hospital Course      Organized by Discharge Diagnosis  Nevaeh Lopes was admitted on 5/2/2017.  The following problems were addressed during her hospitalization:    Cervical spinal stenosis.   - s/p fusion of C4 through C6 on 05/02/2017. Surgery was performed using general anesthesia. EBL was 100.  - Routine post-operative care, including pain mgt and DVT prophylaxis, per surgical team.   - On 5/4 very sleepy. We discussed dangers of combining her PTA ativan and dilaudid. PTA ativan changed to PRN (PTA taking 0.5 mg daily with 0.5 mg q HS as needed) and decreased her dilaudid dose to 1-2 mg. Pain was controlled on this lower dose.      Acute Post-operative Hypoxia, Suspect Post-op Fluid Overload Pulmonary Edema - No known h/o CHF but chronic SHAHEEN, on 40 mg PO torsemide BID. On 5/5 not able to wean O2 going down to 86%. CXR reviewed by myself reveals pulmonary vascular congestions and and some mild interstitial edema. Obesity and decreased mobility likely contributes as well.   - Held torsemide on 5/4 > started lasx 40 mg IV BID, continued PTA potassium 20 meq BID.   - Diuresed and saturating 95% on RA at discharge. K 3.7, Cr 1.07 at discharge.   - Echo recommended as outpatient if not done within the last year. Discussed with patient.     Obstructive sleep apnea. Encouraged use of CPAP, tolerates for a couple hours a night currently. Discussed how this contributes to above.      Diabetes mellitus type 2, HgbA1c was 7.2 on 04/25/2017.   - continued on PTA lantus 35 units.   - takes sliding scale at home with meals, continued on PTA lantus with high SSI QID   [MM1.1]    Recent Labs  Lab 05/06/17  0856 05/06/17  0702 05/05/17  2323 05/05/17  2203 05/05/17  1825 05/05/17  1233 05/05/17  0810 05/05/17  0755  05/03/17  0847  05/02/17  0715   GLC  --  227*  --   --   --   --  160*  --   --  146*  --  164*   *  --  140* 136* 144* 162*  --  155*  < >  --   < >  --    <  > = values in this interval not displayed.[MM1.4]     Hypertension.   - Continue PTA hydralazine, metoprolol and torsemide at discharge.   - BP up a bit at discharge. Follow up as outpatient. If remains elevated, consider increase in torsemide or hydralazine, depending on fluid status.      Gastroesophageal reflux disease.   - Continue PTA omeprazole.      Hypothyroidism.   - Continue PTA levothyroxine.      Depression.   - Continue PTA fluoxetine.      Chronic kidney disease, stage III. Pre-op 1.40 which appears to be her baseline.      Recent Labs  Lab 05/05/17  0810 05/03/17  0847   CR 1.10* 1.11*   - Cr improved from baseline  - BMP ordered daily with attempt at diuresis.          Anxiety   - Resumed PTA ativan daily with PRN @ HS on 5/3. See above, changed to PRN on 5/4.     Rash - She has a small patch of what appears to be psoriasis on left upper arm. It is itchy.   - Prescribed clobetasol 0.05% cream to apply twice daily until resolves.     Amy Terrell    Significant Results and Procedures   S/p fusion of C4 through C6 on 05/02/2017.     Pending Results   None   Unresulted Labs Ordered in the Past 30 Days of this Admission     No orders found from 3/3/2017 to 5/3/2017.          Code Status   Full Code       Primary Care Physician   Bartolome Clark[MM1.1]    Physical Exam   Temp: 98.1  F (36.7  C) Temp src: Oral BP: 159/68   Heart Rate: 62 Resp: 16 SpO2: 95 % O2 Device: None (Room air) Oxygen Delivery: 1 LPM  Vitals:    05/02/17 0717 05/06/17 0526   Weight: 96.2 kg (212 lb) 95.4 kg (210 lb 5.1 oz)[MM1.2]     Vital Signs with Ranges[MM1.1]  Temp:  [98.1  F (36.7  C)-100.3  F (37.9  C)] 98.1  F (36.7  C)  Heart Rate:  [62-67] 62  Resp:  [16] 16  BP: (146-167)/(55-68) 159/68  SpO2:  [93 %-96 %] 95 %  I/O last 3 completed shifts:  In: 795 [P.O.:795]  Out: 1300 [Urine:1300][MM1.2]    Constitutional:  NAD,   Neuropsyche:  alert and oriented, answers questions appropriately.   Respiratory:  Breathing  comfortably, good air exchange, no wheezes, no crackles.   Cardiovascular:  Regular rate and rhythm, 1-2+ edema.  GI:  soft, NT/ND, BS normal  Skin/Integumen:  No acute rash, bruising or sign of bleeding. ~ 2.5 cm erythematous patch with scale on left upper arm, just above elbow.     Discharge Disposition   Discharged to short-term care facility  Condition at discharge: Good    Consultations This Hospital Stay   OCCUPATIONAL THERAPY ADULT IP CONSULT  PHYSICAL THERAPY ADULT IP CONSULT  HOSPITALIST IP CONSULT  SOCIAL WORK IP CONSULT  PHYSICAL THERAPY ADULT IP CONSULT  OCCUPATIONAL THERAPY ADULT IP CONSULT  SPEECH LANGUAGE PATH ADULT IP CONSULT  SWALLOW EVAL SPEECH PATH AT BEDSIDE IP CONSULT    Time Spent on this Encounter   IAmy, personally saw the patient today and spent greater than 30 minutes discharging this patient.    Discharge Orders     Mantoux instructions   Give two-step Mantoux (PPD) Per Facility Policy Yes     Reason for your hospital stay   See discharge summary     Glucose monitor nursing POCT   Before meals and at bedtime     Wound care (specify)   Site:   Anterior neck  Instructions:  Keep wound dry for two more days. May remove steri strips in one week.     Activity - Up with assistive device     Activity - Up with nursing assistance     Activity - Ambulate in hallway   Every shift     General info for SNF   Length of Stay Estimate: Short Term Care: Estimated # of Days <30  Condition at Discharge: Improving  Level of care:skilled   Rehabilitation Potential: Good  Admission H&P remains valid and up-to-date: Yes  Recent Chemotherapy: N/A  Use Nursing Home Standing Orders: Yes     Follow Up (Transitional Care Management)   Follow up with primary care provider, Bartolome Clark, or TCU provider within 7 days for hospital follow- up.  The following labs/tests are recommended: BMP.  Echocardiogram recommended after discharge from TCU, prior to follow up with her primary care provider.     Full  Code     Physical Therapy Adult Consult   Evaluate and treat as clinically indicated.    Reason:  Post op anterior cervical fusion for myelopathy. No neck exercises. rom of bilateral ues.     Occupational Therapy Adult Consult   Evaluate and treat as clinically indicated.    Reason:  adls     Speech Language Path Adult Consult   Evaluate and treat as clinically indicated.    Reason:  Anterior cervical surgery.     Fall precautions     Advance Diet as Tolerated   Follow this diet upon discharge: Orders Placed This Encounter     Snacks/Supplements Adult: Ensure Plus Adult Shake; Between Meals     Advance Diet as Tolerated: Regular Diet Adult; Dysphagia Diet Level 3: Advanced; Thin Liquids (water, ice chips, juice, milk gelatin, ice cream, etc)       Discharge Medications[MM1.1]   Current Discharge Medication List      START taking these medications    Details   clobetasol (TEMOVATE) 0.05 % cream Apply topically 2 times daily    Associated Diagnoses: Psoriasis      HYDROmorphone (DILAUDID) 2 MG tablet Take 0.5-1 tablets (1-2 mg) by mouth every 4 hours as needed for moderate to severe pain  Qty: 30 tablet, Refills: 0    Associated Diagnoses: Cervical disc disease with myelopathy         CONTINUE these medications which have CHANGED    Details   !! LORazepam (ATIVAN) 0.5 MG tablet Take 1 tablet (0.5 mg) by mouth daily as needed  Qty: 20 tablet    Associated Diagnoses: JOSE (generalized anxiety disorder)       !! - Potential duplicate medications found. Please discuss with provider.      CONTINUE these medications which have NOT CHANGED    Details   !! Acetaminophen (TYLENOL PO) Take 1,300 mg by mouth daily as needed for mild pain or fever      VITAMIN D, CHOLECALCIFEROL, PO Take 5,000 Units by mouth daily (takes 5 x 1000 unit tablet = 5000unit dose)      !! Acetaminophen (TYLENOL PO) Take 1,300 mg by mouth 2 times daily       albuterol (PROAIR HFA/PROVENTIL HFA/VENTOLIN HFA) 108 (90 BASE) MCG/ACT Inhaler Inhale 2 puffs  into the lungs every 4 hours as needed for shortness of breath / dyspnea or wheezing      ASPIRIN PO Take 325 mg by mouth At Bedtime      Fexofenadine HCl (ALLEGRA PO) Take 180 mg by mouth daily      !! FLUoxetine HCl (PROZAC PO) Take 40 mg by mouth every other day On Even Days      !! FLUoxetine HCl (PROZAC PO) Take 80 mg by mouth every other day On Odd Days (Takes 2 x 40mg tablet = 80mg dose)      HYDRALAZINE HCL PO Take 100 mg by mouth 3 times daily      insulin aspart (NOVOLOG FLEXPEN) 100 UNIT/ML injection Inject 7-12 Units Subcutaneous 3 times daily (with meals) If BG is over 170 - sliding scale      insulin glargine (LANTUS) 100 UNIT/ML injection Inject 35 Units Subcutaneous every morning      LEVOTHYROXINE SODIUM PO Take 100 mcg by mouth daily      Loperamide HCl (IMODIUM A-D PO) Take 2 mg by mouth every 4 hours as needed      !! LORazepam (ATIVAN PO) Take 0.5 mg by mouth nightly as needed for anxiety       MELATONIN PO Take 10 mg by mouth At Bedtime (takes 2 x 5mg tablet = 10mg dose)      METOPROLOL TARTRATE PO Take 25 mg by mouth 2 times daily      OMEPRAZOLE PO Take 20 mg by mouth daily      Polyethylene Glycol 3350 (MIRALAX PO) Take 17 g by mouth daily as needed      Potassium Chloride Crissy CR (K-DUR PO) Take 20 mEq by mouth 2 times daily (Takes 2 x 10mEq = 20mEq dose)      Pramipexole Dihydrochloride (MIRAPEX PO) Take 0.5 mg by mouth nightly as needed (restless legs)      PRAVASTATIN SODIUM PO Take 20 mg by mouth At Bedtime      TORSEMIDE PO Take 40 mg by mouth 2 times daily (Takes 2 x 20mg tablet = 40mg dose)      NONFORMULARY Take 1-2 capsules by mouth 3 times daily as needed (Irritable Bowel Syndrome) OTC: IBgard       !! - Potential duplicate medications found. Please discuss with provider.      STOP taking these medications       acetaminophen-codeine (TYLENOL #3) 300-30 MG per tablet Comments:   Reason for Stopping:[MM1.5]             Allergies   Allergies   Allergen Reactions     Adhesive Tape  Other (See Comments)     Burning---paper tape OK     Animal Dander      congestion     Dust Mites Other (See Comments)     Runny nose     Nsaids Other (See Comments)     Edema     Oxycodone       hallucinations     Sulfa Drugs Hives     Clindamycin Rash     Data     IMAGING RESULTS FROM THIS HOSPITAL STAY:  Results for orders placed or performed during the hospital encounter of 05/02/17   XR Cervical Spine Port 1 View    Narrative    XR CERVICAL SPINE PORT 1 VW 5/2/2017 10:10 AM    COMPARISON: 4/20/2017    HISTORY: Intraoperative during cervical spinal surgery.      Impression    IMPRESSION: Surgical hardware at the C5-6 level. No fractures are  seen.    BETTE DAWSON   XR Cervical Spine Port 1 View    Narrative    CERVICAL SPINE PORTABLE ONE VIEW   5/2/2017 12:13 PM     HISTORY: Intraoperative film.    COMPARISON: Intraoperative views same day.      Impression    IMPRESSION: Anterior cervical fusion involving C4, C5, and C6 with  interbody prostheses. Alignment appears anatomic. Endotracheal tube  remains in place.    SONU LEON MD   XR Chest Port 1 View    Narrative    XR CHEST PORT 1 VW  5/5/2017 11:08 AM     HISTORY:  hypoxia, post-op    COMPARISON: None.    FINDINGS:  Cardiac silhouette is prominent. Mild pulmonary vascular  congestion. Mild patchy opacities in the lung bases. Bilateral  shoulder arthroplasties.      Impression    IMPRESSION: Cardiomegaly, pulmonary vascular congestion. This could be  due to mild congestive failure or mild fluid overload.    SALLIE DON MD       MOST RECENT LAB RESULTS:  Most Recent 3 CBC's:  Recent Labs   Lab Test  05/03/17   0847   HGB  11.1*      Most Recent 3 BMP's:  Recent Labs   Lab Test  05/06/17   0702  05/05/17   0810  05/03/17   0847   NA  143  143  144   POTASSIUM  3.7  4.0  4.6   CHLORIDE  104  105  109   CO2  30  29  30   BUN  28  27  21   CR  1.07*  1.10*  1.11*   ANIONGAP  9  9  5   ZARA  8.5  8.3*  8.1*   GLC  227*  160*  146*[MM1.1]                  Revision  History        User Key Date/Time User Provider Type Action    > MM1.3 5/6/2017 12:32 PM Amy Terrell MD Physician Sign     MM1.2 5/6/2017 12:31 PM Amy Terrell MD Physician      MM1.4 5/6/2017 12:23 PM Amy Terrell MD Physician      MM1.5 5/6/2017 12:20 PM Amy Terrell MD Physician      MM1.1 5/6/2017 12:17 PM Amy Terrell MD Physician             Discharge Summaries signed by Rambo Martin MD at 5/6/2017 11:49 AM      Author:  Rambo Martin MD Service:  Orthopedics Author Type:  Physician    Filed:  5/6/2017 11:49 AM Date of Service:  5/5/2017  8:18 AM Note Created:  5/5/2017 10:10 AM    Status:  Signed :  Rambo Martin MD (Physician)         DATE OF ADMISSION:  05/02/2017      DATE OF DISCHARGE:  05/05/2017      DIAGNOSIS:  Cervical myelopathy and cervical disk degeneration.      PROCEDURE THIS ADMISSION:  C5 corpectomy, C4-C6 anterior spinal fusion using intervertebral prosthesis, local autograft and instrumentation.      HISTORY OF PRESENT ILLNESS:  Ms. Nevaeh Lopes is a 76-year-old woman who has a history of bilateral upper extremity numbness, weakness and incoordination.  Her preoperative evaluation revealed severe central stenosis at the C4-C5 and C5-C6 levels.  Her preoperative physical examination showed numbness in bilateral upper extremities and signs of myelopathy.  Because of the severe stenosis in her spinal canal and her early symptoms, it was recommended that she undergo the previously mentioned surgical procedure.      HOSPITAL COURSE:  On the day of admission, she was taken to the operating room where she underwent the previously mentioned procedures, tolerated the procedures without difficulty and was returned to regular hospital julio postoperatively.  Her postoperative course was unremarkable.  She made good progress in eating, voiding and ambulating.  She was accomplished using these activities with a walker and assistance at the time of  discharge.  It was felt that she would benefit from a short-term nursing home stay.  At the time of discharge, her hemoglobin was stable.  She was eating and voiding without difficulty.  At the time of this dictation she had not yet had a bowel movement, but this was anticipated prior to discharge.  She was transferred to the rehab center on postoperative day #3.      DISCHARGE MEDICATIONS:   1.  Dilaudid 2 mg tablets 1-2 tablets p.o. q.4-6 hours p.r.n. for pain.   2.  Vitamin D3 at 5000 international units p.o. each day.   3.  Plain Tylenol 1300 mg p.o. twice a day p.r.n.   4.  Albuterol inhaler 2 puffs in the lungs every 4 hours as needed.   5.  Aspirin 325 mg p.o. at bedtime.   6.  Allegra 180 mg p.o. each day.   7.  Prozac 40 mg p.o. every other day on even days and Prozac 80 mg p.o. each day on odd days.   8.  Hydralazine 100 mg p.o. 3 times a day.   9.  Insulin NovoLog FlexPen 100 units/mL injections 7-12 units subcutaneous 3 times a day with meals.   10.  Sliding scale insulin.   11.  Lantus 35 units subcutaneous every morning.   12.  Synthroid 100 mcg p.o. each day.   13.  Imodium 2 mg p.o. q.4 h. as needed.   14.  Lorazepam 0.5 mg p.o. each day at bedtime p.r.n.   15.  Melatonin 10 mg p.o. each day at bedtime.   16.  Metoprolol tartrate 25 mg p.o. twice a day.   17.  Omeprazole 20 mg p.o. each day.   18.  MiraLax 17 g by mouth p.o. each day p.r.n.   19.  Potassium chloride 20 mEq p.o. twice a day.   20.  Mirapex 0.5 mg p.o. each day at bedtime p.r.n.   21.  Pravastatin 20 mg p.o. each day.   22.  Torsemide 40 mg p.o. twice a day.   23.  IBgard 1-2 tablets p.o. 3 times a day as needed for IBS.      She will follow up in my clinic 6 weeks from the time of discharge.  She was placed into a soft cervical collar postoperatively and will wear this until her followup appointment.  She can remove it for showers.  She will have physical, occupational and speech therapy while in the rehab center.         KIMBERLY PEREZ  MD ROHITH             D: 2017 08:18   T: 2017 10:10   MT: cw      Name:     ALBINA LOPES   MRN:      [id number]        Account:        [id number]   :      1940           Admit Date:                                       Discharge Date:       Document: [id number]       cc: Bartolome Clark MD   [JD1.1]   Revision History        User Key Date/Time User Provider Type Action    > JD1.1 2017 11:49 AM Rambo Martin MD Physician Sign                     Consult Notes      Consults signed by Terrence Phillips PA-C at 2017  8:54 PM      Author:  Terrence Phillips PA-C Service:  Hospitalist Author Type:  Physician Assistant - LINDA    Filed:  2017  8:54 PM Date of Service:  2017  6:37 PM Note Created:  2017  7:44 PM    Status:  Cosign Needed :  Terrence Phillips PA-C (Physician Assistant - LINDA)    Cosign Required:  Yes         Consult Orders:    1. Hospitalist IP Consult: Patient to be seen: Routine - within 24 hours; Hospitalist Consult; Consultant may enter orders: Yes [[id number]] ordered by Rambo Martin MD at 17 1244                PRIMARY CARE PROVIDER:  Bartolome Clark MD       REASON FOR CONSULTATION:  Hospitalist consult.      REQUESTING PHYSICIAN:  Dr. Martin.      HISTORY OF PRESENT ILLNESS:  Albina Lopes is a 76-year-old female with past medical history of anxiety, depression, hypertension, obstructive sleep apnea, CVA, CKD, stage III, diabetes mellitus type 2, GERD, asthma and cervical spinal stenosis who underwent a previously scheduled C4-5 anterior cervical fusion this afternoon per Dr. Martin.  Surgery was performed using general anesthesia.  EBL was 100.  Surgery was performed without complication, however, in the immediate postoperative period the patient was found to have coarse breath sounds with increased wheezing and slight hypoxia to 90% oxygen saturation on 4 liters of oxygen.  She was noted to have sleep apnea, however, did not use CPAP.  She  was given a DuoNeb with subsequent improvement and was thus transferred to station 73 for ongoing postoperative monitoring.  Hospitalist Service was asked to consult for co-medical management.      The patient presently is evaluated in the hospital bed with RN at bedside.  She currently reports that she feels good.  She does note that she has chronic numbness to her upper extremities, right greater than left.  She does feel that it is slightly improved in the immediate postoperative period, however, bedside RN does note that she has just recently received Dilaudid.  She denies any difficulty breathing at this time.  No chest pain or chest discomfort.  The patient or beside RN has no acute concerns at this time.      PAST MEDICAL HISTORY:   1.  Cervical spinal stenosis.   2.  Depression.   3.  Anxiety.   4.  Diabetes mellitus type 2, last hemoglobin A1c was 7.2 on 04/25/2017.   5.  Osteopenia.   6.  Hypertension.   7.  Obesity with BMI of 37.   8.  Restless leg syndrome.   9.  Obstructive sleep apnea, is prescribed BiPAP at home but does not use it secondary to claustrophobia.   10.  History of a CVA in 2013 and subclinical small left parietooccipital infarct.   11.  Chronic kidney disease, stage III.   12.  Iron deficiency anemia.   13.  Asthma.   14.  Dyslipidemia.   15.  GERD.   16.  Hypothyroidism.      PAST SURGICAL HISTORY:   1.  Right thumb arthroplasty.   2.  Bilateral cataract removal.   3.  Cholecystectomy.   4.  Hysterectomy with bladder repair.   5.  Right shoulder replacement.   6.  Left shoulder replacement.   7.  Left and subsequently right total knee arthroplasties.      PRIOR TO ADMISSION MEDICATIONS:[AF1.1]    Prescriptions Prior to Admission   Medication Sig Dispense Refill Last Dose     Acetaminophen (TYLENOL PO) Take 1,300 mg by mouth daily as needed for mild pain or fever   Past Week at PRN     LORazepam (ATIVAN PO) Take 0.5 mg by mouth daily   5/1/2017 at AM     VITAMIN D, CHOLECALCIFEROL, PO  Take 5,000 Units by mouth daily (takes 5 x 1000 unit tablet = 5000unit dose)   5/1/2017 at AM     Acetaminophen (TYLENOL PO) Take 1,300 mg by mouth 2 times daily    5/1/2017 at PM     albuterol (PROAIR HFA/PROVENTIL HFA/VENTOLIN HFA) 108 (90 BASE) MCG/ACT Inhaler Inhale 2 puffs into the lungs every 4 hours as needed for shortness of breath / dyspnea or wheezing   Past Week at PRN     ASPIRIN PO Take 325 mg by mouth At Bedtime   4/25/2017 at PM     Fexofenadine HCl (ALLEGRA PO) Take 180 mg by mouth daily   5/1/2017 at AM     FLUoxetine HCl (PROZAC PO) Take 40 mg by mouth every other day On Even Days   4/30/2017 at AM     FLUoxetine HCl (PROZAC PO) Take 80 mg by mouth every other day On Odd Days (Takes 2 x 40mg tablet = 80mg dose)   5/1/2017 at AM     HYDRALAZINE HCL PO Take 100 mg by mouth 3 times daily   5/2/2017 at 0410     insulin aspart (NOVOLOG FLEXPEN) 100 UNIT/ML injection Inject 7-12 Units Subcutaneous 3 times daily (with meals) If BG is over 170 - sliding scale   Past Week at Unknown time     insulin glargine (LANTUS) 100 UNIT/ML injection Inject 35 Units Subcutaneous every morning   5/2/2017 (20 units) at 0410     LEVOTHYROXINE SODIUM PO Take 100 mcg by mouth daily   5/2/2017 at 0410     Loperamide HCl (IMODIUM A-D PO) Take 2 mg by mouth every 4 hours as needed   More than a Month at PRN     LORazepam (ATIVAN PO) Take 0.5 mg by mouth nightly as needed for anxiety    5/1/2017 at PM     MELATONIN PO Take 10 mg by mouth At Bedtime (takes 2 x 5mg tablet = 10mg dose)   5/1/2017 at HS     METOPROLOL TARTRATE PO Take 25 mg by mouth 2 times daily   5/2/2017 at 0410     OMEPRAZOLE PO Take 20 mg by mouth daily   5/1/2017 at AM     Polyethylene Glycol 3350 (MIRALAX PO) Take 17 g by mouth daily as needed   More than a Month at PRN     Potassium Chloride Crissy CR (K-DUR PO) Take 20 mEq by mouth 2 times daily (Takes 2 x 10mEq = 20mEq dose)   5/1/2017 at PM     Pramipexole Dihydrochloride (MIRAPEX PO) Take 0.5 mg by mouth  nightly as needed (restless legs)   5/1/2017 at PM     PRAVASTATIN SODIUM PO Take 20 mg by mouth At Bedtime   5/1/2017 at PM     TORSEMIDE PO Take 40 mg by mouth 2 times daily (Takes 2 x 20mg tablet = 40mg dose)   5/1/2017 at PM     NONFORMULARY Take 1-2 capsules by mouth 3 times daily as needed (Irritable Bowel Syndrome) OTC: IBgard   More than a Month at PRN     acetaminophen-codeine (TYLENOL #3) 300-30 MG per tablet Take 0.5 tablets by mouth 2 times daily   5/1/2017 at PM[AF1.2]           ALLERGIES:   1.  Clindamycin, reaction is a rash.   2.  NSAIDs causing edema.   3.  Oxycodone causing auditory hallucinations.   4.  Sulfa causing hives.   5.  Tape causing burning of the skin.   6.  Seasonal allergies.       FAMILY HISTORY:  Reviewed and is noncontributory to the current admission.      SOCIAL HISTORY:  The patient presently lives alone.  She is a lifelong nonsmoker.  She does not consume alcohol on a regular basis.      REVIEW OF SYSTEMS:  A 10-point review of systems was performed and is otherwise negative.  Please refer to the HPI.      PHYSICAL EXAMINATION:   VITAL SIGNS:  Temperature 98.7, heart rate is 70, respiratory rate of 14, blood pressure of 154/65, SpO2 of 95% on 2 liters of oxygen.   GENERAL:  Well-developed, well-nourished female who appears comfortable.   HEENT:  Head is normocephalic.  Pupils are equal, round, reactive to light bilaterally.  EOMs are intact bilaterally.  Nose, mouth are patent.  Mucous membranes are moist.   NECK:  With a brace in place and a DALY drain with serosanguineous fluid.   LUNGS:  Clear to auscultation bilaterally without wheezes or crackles.   CARDIOVASCULAR:  Regular rate and rhythm, normal S1, S2.   ABDOMEN:  Soft, nontender, nondistended, no guarding or rigidity.  Positive bowel sounds in all 4 quadrants.   NEUROLOGIC:   strength is 4/5 bilaterally.  The patient is spontaneously moving bilateral upper and lower extremities.  Plantar flexion and dorsiflexion is  5/5 bilaterally.   SKIN:  Warm and dry, no rash.  There is no pedal edema.      LABORATORY DATA:  Preoperative laboratory studies performed on 04/25/2017 per pre-admit H&P with a BMP with a sodium of 143, potassium of 4.0, chloride 105, bicarbonate 27, BUN of 39, creatinine of 1.43, glucose 222, calcium 9.1.  CBC with hemoglobin of 12.9, platelet of 258, WBC of 6.0.      ASSESSMENT AND PLAN:  Nevaeh Lopes is a 76-year-old female with past medical history of diabetes mellitus type 2, hypertension, hyperlipidemia, obstructive sleep apnea, does not use CPAP, anxiety, depression and cervical spinal stenosis who underwent previously scheduled C4 through C6 anterior cervical fusion per Dr. Martin.  Hospitalist Service was consulted postoperatively for co-medical management.   1.  Cervical spinal stenosis.  The patient is status post anterior cervical fusion of C4 through C6 on 05/02/2017.  Surgery was performed using general anesthesia.  EBL was 100.  Continue postoperative monitoring and management as per the primary service.   2.  Diabetes mellitus type 2, last hemoglobin A1c was 7.2 on 04/25/2017.  The patient is maintained prior to admission on Lantus 35 units subcutaneously every morning as well as a NovoLog sliding scale with meals.  Her prior to admission Lantus has been resumed as an a.m. dosing at full dose and her NovoLog has been held.  She has been placed on hospital policy high insulin sliding scale with correctional insulin and frequent Accu-Cheks.   3.  Hypertension.  Continue prior to admission hydralazine, metoprolol and torsemide with hold parameters.  Torsemide is to begin in a.m. as patient is presently receiving IV fluids.   4.  Gastroesophageal reflux disease.  Continue prior to admission omeprazole.   5.  Hypothyroidism.  Continue prior to admission levothyroxine.   6.  Depression.  Continue prior to admission fluoxetine.   7.  Chronic kidney disease, stage III.  The patient's creatinine  preoperatively was 1.40 which appears to be her baseline.  She will have a repeat BMP in the morning.  Will closely monitor renal function and avoid nephrotoxic medications.   8.  Obstructive sleep apnea.  The patient does not use CPAP at baseline.  However, did have intermittent difficulty breathing postoperatively, improved with DuoNeb treatment.  She can have supplemental oxygen as needed overnight to assist with saturations and she also will have albuterol p.r.n. for any further wheezing or difficulty breathing.   9.  Deep venous thrombosis prophylaxis.  PCDs as per primary service.      We, the Hospitalist Service, thank you for this consultation.      This patient was staffed with Dr. Lb Redding, who independently interviewed and evaluated patient and is agreement with the above-mentioned plan.      LB REDDING MD       As dictated by TERRENCE ANDERSON PA-C            D: 2017 18:37   T: 2017 19:43   MT: FAUSTINO      Name:     ALBINA SAXENA   MRN:      [id number]        Account:       [id number]   :      1940           Consult Date:  2017      Document: [id number][AF1.1]         Revision History        User Key Date/Time User Provider Type Action    > AF1.1 2017  8:54 PM Terrence Anderson PA-C Physician Assistant - C Sign     AF1.2 2017  8:53 PM Terrence Anderson PA-C Physician Assistant - C      [N/A] 2017  7:44 PM Terrence Anderson PA-C Physician Assistant - C Edit                     Progress Notes - Physician (Notes from 17 through 17)      Progress Notes by Ivania Eddy SLP at 2017  2:53 PM     Author:  Ivania Eddy SLP Service:  (none) Author Type:  Speech Language Pathologist    Filed:  2017  2:53 PM Date of Service:  2017  2:53 PM Note Created:  2017  2:53 PM    Status:  Signed :  Ivania Eddy SLP (Speech Language Pathologist)            17 1445   General Information   Onset Date 17   Start of Care Date 17  "  Referring Physician Dr. Martin   Patient Profile Review/OT: Additional Occupational Profile Info See Profile for full history and prior level of function   Patient/Family Goals Statement To stop \"choking\" while eating   Swallowing Evaluation Bedside swallow evaluation   Behaviorial Observations WFL (within functional limits)   Mode of current nutrition Oral diet   Type of oral diet Dysphagia diet level 3;Thin liquid   Respiratory Status O2 Supply   Type of O2 supply Nasal cannula   Comments SLP: This 76 year old female was referred to  with swallowing difficulties noted by RN, \"Pt started having difficulty swallowing this morning. Only taking small sips of water and coughing.\" RN and PT reported immediate coughing noted with water. Pt underwent s/p fusion of c4 through c6 on 5/2/17. Pt progressed to dysphagia diet level 3 with thin liquids and was discharging today to TCU prior to difficulty swallowing. Past medical history includes: GERD, diabetes mellitus type 2, hypertension, hyperlipidemia, obstructive sleep apnea, does not use CPAP, anxiety, depression. Soft collar currently in place. Chest x-ray ordered due to pt unable to wean off oxygen and 88% on room air.    Clinical Swallow Evaluation   Oral Musculature generally intact   Structural Abnormalities none present   Dentition present and adequate   Mucosal Quality good   Mandibular Strength and Mobility intact   Oral Labial Strength and Mobility WFL   Lingual Strength and Mobility WFL   Velar Elevation other (see comments)  (Unable to visualize)   Buccal Strength and Mobility intact   Laryngeal Function Cough;Throat clear   Additional Documentation Yes   Clinical Swallow Eval: Thin Liquid Texture Trial   Mode of Presentation, Thin Liquids straw;cup   Oral Phase of Swallow Premature pharyngeal entry  (suspected)   Pharyngeal Phase of Swallow coughing/choking;feeling of something stuck in throat;throat clearing   Diagnostic Statement Immediate cough noted with " thin water via cup and straw. Vocal quality remained clear.    Clinical Swallow Eval: Nectar Thick Liquid Texture Trial   Mode of Presentation, Nectar spoon   Oral Phase, Nectar WFL   Pharyngeal Phase, [place] intact   Diagnostic Statement No overt s/sx of aspiration noted with sip size controlled with teaspoon.    Clinical Swallow Eval: Puree Solid Texture Trial   Mode of Presentation, Puree spoon;self-fed   Oral Phase, Puree Poor AP movement   Pharyngeal Phase, Puree coughing/choking;feeling of something stuck in throat;regurgitation of food/liquids   Diagnostic Statement Poor AP movement noted likely due to pt reporting fear of swallowing. Cough with regurgitation noted on small 1/4 tsp bite.    Esophageal Phase of Swallow   Patient reports or presents with symptoms of esophageal dysphagia Yes  (GERD history)   Swallow Eval: Clinical Impressions   Skilled Criteria for Therapy Intervention Skilled criteria met.  Treatment indicated.   Functional Assessment Scale (FAS) 3   Treatment Diagnosis Oropharyngeal dysphagia   Diet texture recommendations Full liquid;Nectar thick liquids   Recommended Feeding/Eating Techniques hard swallow w/ each bite or sip;maintain upright posture during/after eating for 30 mins;no straws;small sips/bites  (Via teaspoon only)   Therapy Frequency daily   Predicted Duration of Therapy Intervention (days/wks) 1 week   Anticipated Discharge Disposition extended care facility   Risks and Benefits of Treatment have been explained. Yes   Patient, family and/or staff in agreement with Plan of Care Yes   Clinical Impression Comments SLP: Bedside swallow evaluation performed revealing moderate oropharyngeal dysphagia likely due to swelling associated with c4 through c6 fusion. Vocal quality noted to be WFL with pt reporting mildly lower voice, but not abnormal for her. Oral motor exam WFL with adequate strength and ROM. Swallow unable to be visualized or palpitated due to collar. Pt reported puree  "solids getting, \"stuck\" and regurgitated. This was observed with   tsp bite of applesauce. Thin liquids trialed with immediate cough and pt reporting feeling difficulty breathing. Strong cough noted. Nectar thick liquids via spoon trialed with pt reporting improvement in swallow with no overt s/sx of aspiration noted. Ice chips via spoon trialed with adequate mastication and no overt s/sx of aspiration. Recommended: full liquid, nectar thick diet, liquids via teaspoon to slow rate and manage sip size, GERD precautions including upright with all intake and 30 minutes after, meds crushed in nectar thick liquids. ST to follow tomorrow for diet tolerance and upgrade as appropriate. Pt to DC to TCU with further ST pending pt progress.    Total Evaluation Time   Total Evaluation Time (Minutes) 23[AQ1.1]        Revision History        User Key Date/Time User Provider Type Action    > AQ1.1 5/5/2017  2:53 PM Ivania Eddy, SLP Speech Language Pathologist Sign            Progress Notes by Pat Nugent at 5/5/2017  2:36 PM     Author:  Pat Nugent Service:  (none) Author Type:      Filed:  5/5/2017  2:40 PM Date of Service:  5/5/2017  2:36 PM Note Created:  5/5/2017  2:36 PM    Status:  Signed :  Pat Nugent ()         Social Work Progress Note  Pt chart reviewed. Pt discussed in interdisciplinary rounds.     Intervention: Pt d/c was cancelled today due to medical course. Pt to tentatively d/c tomorrow to [facility]. [facility] admissions was updated of the above.     Plan:  [facility].   Follow-up plan: Sw to continue to follow.     CONNER Grant, [facility]  [phone]  1440[NB1.1]     Revision History        User Key Date/Time User Provider Type Action    > NB1.1 5/5/2017  2:40 PM Pat Nugent  Sign            Progress Notes by Amy Terrell MD at 5/5/2017  2:14 PM     Author:  Amy Terrell " MD CECY Service:  Hospitalist Author Type:  Physician    Filed:  5/5/2017  2:28 PM Date of Service:  5/5/2017  2:14 PM Note Created:  5/5/2017  2:14 PM    Status:  Signed :  Amy Terrell MD (Physician)         [facility]    Hospitalist Progress Note    Date of Service (when I saw the patient): 05/05/2017    Assessment & Plan   Nevaeh Lopes is a 76-year-old female with past medical history of diabetes mellitus type 2, hypertension, hyperlipidemia, obstructive sleep apnea, does not use CPAP, anxiety, depression and cervical spinal stenosis who underwent previously scheduled C4 through C6 anterior cervical fusion per Dr. Martin. Hospitalist Service was consulted postoperatively for co-medical management.     Cervical spinal stenosis.   - s/p fusion of C4 through C6 on 05/02/2017. Surgery was performed using general anesthesia. EBL was 100.  - Routine post-operative care, including pain mgt and DVT prophylaxis, per surgical team.   - On 5/4 very sleepy. We discussed dangers of combining her PTA ativan and dilaudid. PTA ativan changed to PRN (only taking 0.5 mg daily with 0.5 mg as needed) and decreased her dilaudid dose to 1-2 mg and currently only using 1 mg with pain controlled.     Acute Post-operative Hypoxia, Suspect Post-op Fluid Overload Pulmonary Edema - No known h/o CHF but chronic SHAHEEN, on 40 mg PO torsemide BID. On 5/5 not able to wean O2 going down to 86%. CXR reviewed by myself reveals pulmonary vascular congestions and and some mild interstitial edema. Obesity and decreased mobility likely contributes as well.   - Hold torsemide > start lasx 40 mg IV BID, continue PTA potassium 20 meq BID.   - Follow daily weights and I&O's. Can place murillo if unable to track strict UOP.   - Hold on discharge.   - Ambulate, IS.   - Echo as outpatient if not done within the last year.     Diabetes mellitus type 2, HgbA1c was 7.2 on 04/25/2017.   - continues on PTA lantus 35 units.   - takes sliding  scale at home with meals, continued on PTA lantus with high SSI QID    Recent Labs  Lab 05/05/17  1233 05/05/17  0810 05/05/17  0755 05/05/17  0215 05/04/17  2048 05/04/17  1707 05/04/17  1235  05/03/17  0847  05/02/17  0715   GLC  --  160*  --   --   --   --   --   --  146*  --  164*   *  --  155* 158* 170* 234* 219*  < >  --   < >  --    < > = values in this interval not displayed.    Hypertension.   - Continue PTA hydralazine, metoprolol and torsemide with hold parameters.   - PTA Torsemide and potassium resumed on 5/3. > changed to IV lasix on 5/5 as above.   - Adequately controlled on 5/4.     Gastroesophageal reflux disease.   - Continue PTA  omeprazole.     Hypothyroidism.   - Continue PTA levothyroxine.     Depression.   - Continue PTA fluoxetine.     Chronic kidney disease, stage III. Pre-op 1.40 which appears to be her baseline.     Recent Labs  Lab 05/05/17  0810 05/03/17  0847   CR 1.10* 1.11*   - Cr improved from baseline  - BMP ordered daily with attempt at diuresis.      Obstructive sleep apnea. The patient does not use CPAP at baseline. However, did have intermittent difficulty breathing postoperatively, improved with DuoNeb treatment. She can have supplemental oxygen as needed overnight to assist with saturations and she also will have albuterol p.r.n. for any further wheezing or difficulty breathing.     Anxiety   - Resumed PTA ativan daily with PRN @ HS on 5/3. See above, changed to PRN on 5/4.     Deep venous thrombosis prophylaxis. PCDs as per primary service.     Amy Terrell  [phone] (p)  Text Page (7AM - 6PM)     Interval History   Unable to wean off oxygen, down to 84% on RA. Otherwise, no acute SOB or chest pain. Chronic swelling unchnaged from baseline. No weights since admission. No fevers, cough. CXR obtained as above.     -Data reviewed today: I reviewed all new labs and imaging results over the last 24 hours. I personally reviewed the chest x-ray image(s) showing as  above. .    Physical Exam   Temp: 98.7  F (37.1  C) Temp src: Oral BP: 161/57   Heart Rate: 72 Resp: 16 SpO2: 91 % O2 Device: Nasal cannula Oxygen Delivery: 1 LPM  Vitals:    05/02/17 0717   Weight: 96.2 kg (212 lb)     Vital Signs with Ranges  Temp:  [97.9  F (36.6  C)-99.4  F (37.4  C)] 98.7  F (37.1  C)  Heart Rate:  [61-76] 72  Resp:  [14-16] 16  BP: (140-170)/(47-64) 161/57  SpO2:  [84 %-96 %] 91 %  I/O last 3 completed shifts:  In: 540 [P.O.:540]  Out: -     Constitutional: Appears comfortable, NAD, neck brace   Neuropsyche:  alert and oriented, answers questions appropriately.   HEENT: Neck brace in place.   Respiratory:  Breathing comfortably, good air exchange, no wheezes, minimal crackles in the lower lung fields.    Cardiovascular:  Regular rate and rhythm, 1-2+ edema.  GI:  soft, NT/ND, BS normal  Skin/Integumen:  No acute rash or sign of bleeding.     Medications   All medications reviewed on 05/05/17        furosemide  40 mg Intravenous BID     fexofenadine (ALLEGRA) tablet 180 mg  180 mg Oral Daily     FLUoxetine (PROzac) capsule 40 mg  40 mg Oral Every Other Day     FLUoxetine (PROzac) capsule 80 mg  80 mg Oral Every Other Day     hydrALAZINE (APRESOLINE) tablet 100 mg  100 mg Oral TID     levothyroxine (SYNTHROID/LEVOTHROID) tablet 100 mcg  100 mcg Oral Daily     metoprolol (LOPRESSOR) tablet 25 mg  25 mg Oral BID     omeprazole (priLOSEC) CR capsule 20 mg  20 mg Oral Daily     polyethylene glycol  17 g Oral Daily     potassium chloride SA (K-DUR/KLOR-CON M) CR tablet 20 mEq  20 mEq Oral BID w/meals     pravastatin (PRAVACHOL) tablet 20 mg  20 mg Oral At Bedtime     cholecalciferol  5,000 Units Oral Daily     sodium chloride (PF)  3 mL Intracatheter Q8H     insulin aspart  1-10 Units Subcutaneous TID AC     insulin aspart  1-7 Units Subcutaneous At Bedtime     insulin glargine  35 Units Subcutaneous QAM       Data     Recent Labs  Lab 05/05/17  0810 05/03/17  0847 05/02/17  0715   [facility]  --  11.1*  --      144  --    POTASSIUM 4.0 4.6 4.2   CHLORIDE 105 109  --    CO2 29 30  --    BUN 27 21  --    CR 1.10* 1.11*  --    ANIONGAP 9 5  --    ZARA 8.3* 8.1*  --    * 146* 164*       Recent Results (from the past 24 hour(s))   XR Chest Port 1 View    Narrative    XR CHEST PORT 1 VW  5/5/2017 11:08 AM     HISTORY:  hypoxia, post-op    COMPARISON: None.    FINDINGS:  Cardiac silhouette is prominent. Mild pulmonary vascular  congestion. Mild patchy opacities in the lung bases. Bilateral  shoulder arthroplasties.      Impression    IMPRESSION: Cardiomegaly, pulmonary vascular congestion. This could be  due to mild congestive failure or mild fluid overload.    SALLIE DON MD       '[MM1.1]     Revision History        User Key Date/Time User Provider Type Action    > MM1.1 5/5/2017  2:28 PM Amy Terrell MD Physician Sign            Progress Notes by Rambo Martin MD at 5/5/2017  8:01 AM     Author:  Rambo Martin MD Service:  Orthopedics Author Type:  Physician    Filed:  5/5/2017  8:07 AM Date of Service:  5/5/2017  8:01 AM Note Created:  5/5/2017  8:01 AM    Status:  Signed :  Rambo Martin MD (Physician)         History:   Minimal c/o.[JD1.1]  Ambulated in hallway yesterday[JD1.2] Tolerating[JD1.1] DD3[JD1.2] diet[JD1.1] without difficulty[JD1.2].  Preop symptoms[JD1.1] improved. No BM yet.[JD1.2]  Vitals:   B/P: 155/59, T: 99.4, P: Data Unavailable, R: 14    Intake/Output Summary (Last 24 hours) at 05/05/17 0802  Last data filed at 05/04/17 1800   Gross per 24 hour   Intake              540 ml   Output                0 ml   Net              540 ml          Lab Results   Component Value Date     05/03/2017    Lab Results   Component Value Date    CHLORIDE 109 05/03/2017    Lab Results   Component Value Date    BUN 21 05/03/2017      Lab Results   Component Value Date    POTASSIUM 4.6 05/03/2017    POTASSIUM 4.2 05/02/2017    Lab Results   Component Value Date    CO2 30  05/03/2017    Lab Results   Component Value Date    CR 1.11 05/03/2017[JD1.1]        Lab Results   Component Value Date    HGB 11.1 (L) 05/03/2017        Results for orders placed or performed during the hospital encounter of 05/02/17 (from the past 24 hour(s))   Glucose by meter   Result Value Ref Range    Glucose 219 (H) 70 - 99 mg/dL   Glucose by meter   Result Value Ref Range    Glucose 234 (H) 70 - 99 mg/dL   Glucose by meter   Result Value Ref Range    Glucose 170 (H) 70 - 99 mg/dL   Glucose by meter   Result Value Ref Range    Glucose 158 (H) 70 - 99 mg/dL   Glucose by meter   Result Value Ref Range    Glucose 155 (H) 70 - 99 mg/dL[JD1.3]       Dressing:   Dry and intact. Brace/collar intact and well fitted.   Neuro:   Motor: 5/5   Sensation:[JD1.1] decreased in bilateral ues as preop.[JD1.2]      A/P:   Stable POD #[JD1.1] 3   To NH today   Summary dictated[JD1.2]       Revision History        User Key Date/Time User Provider Type Action    > JD1.2 5/5/2017  8:07 AM Rambo Martin MD Physician Sign     JD1.3 5/5/2017  8:02 AM Rambo Martin MD Physician      JD1.1 5/5/2017  8:01 AM Rambo Martin MD Physician             Progress Notes by Pat Nugent at 5/4/2017 12:06 PM     Author:  Pat Nugent Service:  (none) Author Type:      Filed:  5/4/2017  3:27 PM Date of Service:  5/4/2017 12:06 PM Note Created:  5/4/2017 12:06 PM    Status:  Addendum :  Pat Nugent ()         Social Work Progress Note  Pt chart reviewed. Pt discussed in interdisciplinary rounds.     Intervention: Jacobo received a call from admissions requesting pt's referral. Jacobo faxed over the referral via discharge on the double. Jacobo later spoke with Angela at [facility] and she confirmed that they are able to accept pt tomorrow. Sw to confirm transport time with pt and update Angela.     Plan:  Anticipated Discharge Placement: [facility]   Barriers: None  identified at this time.   Follow-up plan: Sw to continue to follow.    CONNER Grant, [facility]  [phone]  1215[NB1.1]    ADDENDUM:   I/P: Sw met with pt and she explained that a friend will transport her tomorrow at d/c. Pt will let sw know when she knows what time her friend could transport her tomorrow.[NB1.2] Pt may need 02 at d/c because she is currently on[NB1.3] 2[NB1.4]L NC.[NB1.3]     PAS-RR    D: Per DHS regulation, ELENA completed and submitted PAS-RR to [place] [employer] on [employer]ging Direct Connect via the Senior LinkAge Line.  PAS-RR confirmation # is : [id number]    P: Further questions may be directed to Senior LinkAge Line at #[phone], option #4 for PAS-RR staff.[NB1.4]      Pat RaffiCONNER, [facility]  [phone]  1[NB1.2]526[NB1.4]     Revision History        User Key Date/Time User Provider Type Action    > NB1.4 5/4/2017  3:27 PM Pat Nugent  Addend     NB1.3 5/4/2017  2:36 PM Pat Nugent  Addend     NB1.2 5/4/2017  2:34 PM Pat Nugent  Addend     NB1.1 5/4/2017 12:15 PM Pat Nugent  Sign            Progress Notes by Amy Terrell MD at 5/4/2017 10:25 AM     Author:  Amy Terrell MD Service:  Hospitalist Author Type:  Physician    Filed:  5/4/2017 10:35 AM Date of Service:  5/4/2017 10:25 AM Note Created:  5/4/2017 10:25 AM    Status:  Signed :  Amy Terrell MD (Physician)         [facility]    Hospitalist Progress Note    Date of Service (when I saw the patient): 05/04/2017    Assessment & Plan   Nevaeh Lopes is a 76-year-old female with past medical history of diabetes mellitus type 2, hypertension, hyperlipidemia, obstructive sleep apnea, does not use CPAP, anxiety, depression and cervical spinal stenosis who underwent previously scheduled C4 through C6 anterior cervical fusion per Dr. Martin. Hospitalist Service was consulted postoperatively for co-medical management.      Cervical spinal stenosis.   - s/p fusion of C4 through C6 on 05/02/2017. Surgery was performed using general anesthesia. EBL was 100.  - Routine post-operative care, including pain mgt and DVT prophylaxis, per surgical team.   - Note: on 5/4 patient denies pain but complains that she is lilia sleepy all the time. We had a long discussion re: the dangers of combining her PTA ativan and dilaudid. PTA ativan changed to PRN (only taking 0.5 mg daily with 0.5 mg as needed) and decreased her dilaudid dose as she denies significant pain.   - If pain is not controlled on this dose, I would increase it back to 2-4 mg at less frequent intervals and decrease ativan to 0.25 mg PRN.     Diabetes mellitus type 2, HgbA1c was 7.2 on 04/25/2017. - continues on PTA lantus 35 units.   - takes sliding scale at home with meals, continued on PTA lantus with high SSI QID    Recent Labs  Lab 05/04/17  0716 05/04/17  0207 05/03/17  2101 05/03/17  1732 05/03/17  1216 05/03/17  0847 05/03/17  0802  05/02/17  0715   GLC  --   --   --   --   --  146*  --   --  164*   * 121* 109* 113* 169*  --  134*  < >  --    < > = values in this interval not displayed.    Hypertension.   - Continue PTA hydralazine, metoprolol and torsemide with hold parameters.   - PTA Torsemide and potassium resumed on 5/3.   - BMP ordered for AM.   - Adequately controlled on 5/4.     Gastroesophageal reflux disease.   - Continue PTA  omeprazole.     Hypothyroidism.   - Continue PTA levothyroxine.     Depression.   - Continue PTA fluoxetine.     Chronic kidney disease, stage III. Pre-op 1.40 which appears to be her baseline.     Recent Labs  Lab 05/03/17  0847   CR 1.11*   - Cr improved from baseline  - BMP ordered for AM.     Obstructive sleep apnea. The patient does not use CPAP at baseline. However, did have intermittent difficulty breathing postoperatively, improved with DuoNeb treatment. She can have supplemental oxygen as needed overnight to assist with  saturations and she also will have albuterol p.r.n. for any further wheezing or difficulty breathing.     Anxiety   - Resumed PTA ativan daily with PRN @ HS on 5/3. See above, changed to PRN on 5/4.     Deep venous thrombosis prophylaxis. PCDs as per primary service.     Amy Terrell  [phone] (p)  Text Page (7AM - 6PM)     Interval History   She denies pain but states she feels sleepy. She can't stay awake. No SOB, N/V.     -Data reviewed today: I reviewed all new labs and imaging results over the last 24 hours. I personally reviewed no images or EKG's today.    Physical Exam   Temp: 99.4  F (37.4  C) Temp src: Oral BP: 156/59   Heart Rate: 81 Resp: 16 SpO2: 98 % O2 Device: Nasal cannula Oxygen Delivery: 2 LPM  Vitals:    05/02/17 0717   Weight: 96.2 kg (212 lb)     Vital Signs with Ranges  Temp:  [98.2  F (36.8  C)-99.7  F (37.6  C)] 99.4  F (37.4  C)  Heart Rate:  [66-81] 81  Resp:  [16-18] 16  BP: (139-175)/(48-64) 156/59  SpO2:  [93 %-100 %] 98 %  I/O last 3 completed shifts:  In: 2037.5 [P.O.:350; I.V.:1687.5]  Out: 1455 [Urine:1400; Drains:55]    Constitutional: Appears comfortable, NAD,   Neuropsyche:  alert and oriented, answers questions appropriately.   HEENT: Neck brace in place.   Respiratory:  Breathing comfortably, good air exchange, no wheezes, no crackles.   Cardiovascular:  Regular rate and rhythm, no edema.  GI:  soft, NT/ND, BS normal  Skin/Integumen:  No acute rash or sign of bleeding.     Medications   All medications reviewed on 05/04/17     NaCl 75 mL/hr at 05/04/17 0600       LORazepam (ATIVAN) tablet 0.5 mg  0.5 mg Oral Daily     fexofenadine (ALLEGRA) tablet 180 mg  180 mg Oral Daily     FLUoxetine (PROzac) capsule 40 mg  40 mg Oral Every Other Day     FLUoxetine (PROzac) capsule 80 mg  80 mg Oral Every Other Day     hydrALAZINE (APRESOLINE) tablet 100 mg  100 mg Oral TID     levothyroxine (SYNTHROID/LEVOTHROID) tablet 100 mcg  100 mcg Oral Daily     metoprolol (LOPRESSOR) tablet 25  mg  25 mg Oral BID     omeprazole (priLOSEC) CR capsule 20 mg  20 mg Oral Daily     polyethylene glycol  17 g Oral Daily     potassium chloride SA (K-DUR/KLOR-CON M) CR tablet 20 mEq  20 mEq Oral BID w/meals     pravastatin (PRAVACHOL) tablet 20 mg  20 mg Oral At Bedtime     torsemide (DEMADEX) tablet 40 mg  40 mg Oral BID     cholecalciferol  5,000 Units Oral Daily     sodium chloride (PF)  3 mL Intracatheter Q8H     insulin aspart  1-10 Units Subcutaneous TID AC     insulin aspart  1-7 Units Subcutaneous At Bedtime     insulin glargine  35 Units Subcutaneous QAM       Data     Recent Labs  Lab 05/03/17  0847 05/02/17  0715   HGB 11.1*  --      --    POTASSIUM 4.6 4.2   CHLORIDE 109  --    CO2 30  --    BUN 21  --    CR 1.11*  --    ANIONGAP 5  --    ZARA 8.1*  --    * 164*       No results found for this or any previous visit (from the past 24 hour(s)).    '[MM1.1]     Revision History        User Key Date/Time User Provider Type Action    > MM1.1 5/4/2017 10:35 AM Amy Terrell MD Physician Sign            Progress Notes by Rambo Martin MD at 5/4/2017  9:08 AM     Author:  Rambo Martin MD Service:  Orthopedics Author Type:  Physician    Filed:  5/4/2017  9:11 AM Date of Service:  5/4/2017  9:08 AM Note Created:  5/4/2017  9:08 AM    Status:  Signed :  Rambo Martin MD (Physician)         History:   Minimal c/o.  Pain control on oral meds Tolerating full liquid diet, has a poor appetite..  Preop symptoms improving.  Ambulating in room yesterday.  Vitals:   B/P: 156/59, T: 99.4, P: Data Unavailable, R: 16    Intake/Output Summary (Last 24 hours) at 05/04/17 0908  Last data filed at 05/04/17 0600   Gross per 24 hour   Intake           2037.5 ml   Output             1455 ml   Net            582.5 ml          Lab Results   Component Value Date     05/03/2017    Lab Results   Component Value Date    CHLORIDE 109 05/03/2017    Lab Results   Component Value Date     BUN 21 05/03/2017      Lab Results   Component Value Date    POTASSIUM 4.6 05/03/2017    POTASSIUM 4.2 05/02/2017    Lab Results   Component Value Date    CO2 30 05/03/2017    Lab Results   Component Value Date    CR 1.11 05/03/2017        Lab Results   Component Value Date    HGB 11.1 (L) 05/03/2017        Results for orders placed or performed during the hospital encounter of 05/02/17 (from the past 24 hour(s))   Glucose by meter   Result Value Ref Range    Glucose 169 (H) 70 - 99 mg/dL   Glucose by meter   Result Value Ref Range    Glucose 113 (H) 70 - 99 mg/dL   Glucose by meter   Result Value Ref Range    Glucose 109 (H) 70 - 99 mg/dL   Glucose by meter   Result Value Ref Range    Glucose 121 (H) 70 - 99 mg/dL   Glucose by meter   Result Value Ref Range    Glucose 104 (H) 70 - 99 mg/dL     DALY output 10 cc since MN.  Dressing:   Dry and intact. Brace/collar intact and well fitted.   Neuro:   Motor: 4/5 in the left deltoid and biceps otherwise 5/5 in upper and lower extremities  Sensation: Decreased globally in upper extremities, patient feels this is better than preop     A/P:   Stable POD # two   Hemovac and change dressing today.  Will encourage p.o. intake and start protein supplements t.i.d.  Transfer to [facility] tomorrow.[JD1.1]       Revision History        User Key Date/Time User Provider Type Action    > JD1.1 5/4/2017  9:11 AM Rambo Martin MD Physician Sign            Progress Notes by Chapis Goldstein LSW at 5/3/2017 12:56 PM     Author:  Chapis Goldstein LSW Service:  Social Work Author Type:      Filed:  5/3/2017  4:01 PM Date of Service:  5/3/2017 12:56 PM Note Created:  5/3/2017 12:56 PM    Status:  Addendum :  Chapis Goldstein LSW ()         Care Transition Initial Assessment -   Reason For Consult: discharge planning  Met with: Patient , she likes to be called DHEATH.   Active Problems:    Cervical disc disease with myelopathy         DATA  Lives With:  alone  Living Arrangements: independent living facility  Description of Support System: Supportive  Who is your support system?: Children, Other (specify) (friend)  Pt states that she has recently re-connected with her sister and that they speak every day. She has six children.   Identified issues/concerns regarding health management: Pt lives alone. She has some  Friends, Nadege and Sudhakar who assist her with transportation and groceries. She has several adult children. Pt has had home care in the past but does not remember which agency. Pt has applied for PCA assistance but does not qualify. Pt has had multiple admissions to [facility] in [place][SC1.1]  ( [phone] fax: [phone])[SC1.2] and is pre-registered there for transitional care. SW contacted [facility] and left a voice message to confirm pre-registration.[SC1.1] Angela in admissions returned call and confirmed that they would have a bed for pt on Friday.[SC1.3]    Transportation Available: family or friend will provide (pt's neighbors provide transportation)    ASSESSMENT  Cognitive Status:  awake and alert  Concerns to be addressed:    PLAN  Financial costs for the patient includes N/A  Patient given options and choices for discharge: Yes. [facility] and ARU discussed  Patient/family is agreeable to the plan?  Yes:   Patient anticipates discharging to: [facility].[SC1.1]     Revision History        User Key Date/Time User Provider Type Action    > SC1.3 5/3/2017  4:01 PM Chapis Goldstein LSW  Addend     SC1.2 5/3/2017  1:16 PM Chapis Goldstein LSW  Addend     SC1.1 5/3/2017  1:11 PM Chapis Goldstein LSW  Sign            Progress Notes by Amy Terrell MD at 5/3/2017  8:18 AM     Author:  Amy Terrell MD Service:  Hospitalist Author Type:  Physician    Filed:  5/3/2017  2:36 PM Date of Service:  5/3/2017  8:18 AM Note Created:  5/3/2017  8:18 AM    Status:  Signed :  Xander  Amy SAM MD (Physician)         [facility]    Hospitalist Progress Note    Date of Service (when I saw the patient): 05/03/2017    Assessment & Plan   Nevaeh Lopes is a 76-year-old female with past medical history of diabetes mellitus type 2, hypertension, hyperlipidemia, obstructive sleep apnea, does not use CPAP, anxiety, depression and cervical spinal stenosis who underwent previously scheduled C4 through C6 anterior cervical fusion per Dr. Martin. Hospitalist Service was consulted postoperatively for co-medical management.     Cervical spinal stenosis.[MM1.1]   - s/p[MM1.2] fusion of C4 through C6 on 05/02/2017. Surgery was performed using general anesthesia. EBL was 100.[MM1.1]  - Routine post-operative care, including pain mgt and DVT prophylaxis, per surgical team.[MM1.2]   - D/c IVF.[MM1.3]     Diabetes mellitus type 2,[MM1.1] Hgb[MM1.2]A1c was 7.2 on 04/25/2017.[MM1.1] - continues on PTA lantus 35 units.   - takes sliding scale at home with meals, continued on high SSI QID[MM1.2]    Recent Labs  Lab 05/03/17  0802 05/03/17  0605 05/02/17  2241 05/02/17  1754 05/02/17  1300 05/02/17  1129 05/02/17  0715   GLC  --   --   --   --   --   --  164*   * 141* 139* 176* 178* 159*  --        Hypertension.[MM1.1]   -[MM1.2] Continue[MM1.1] PTA[MM1.2] hydralazine, metoprolol and torsemide with hold parameters.[MM1.1]   - PTA[MM1.2] Torsemide[MM1.1] and potassium[MM1.3] resumed on 5/3.[MM1.2]     Gastroesophageal reflux disease.[MM1.1]   -[MM1.2] Continue[MM1.1] PTA[MM1.2]  omeprazole.     Hypothyroidism.[MM1.1]   -[MM1.2] Continue[MM1.1] PTA[MM1.2] levothyroxine.     Depression.[MM1.1]   -[MM1.2] Continue[MM1.1] PTA[MM1.2] fluoxetine.     Chronic kidney disease, stage III.[MM1.1] Pre-op[MM1.2] 1.40 which appears to be her baseline.[MM1.1]     Recent Labs  Lab 05/03/17  0847   CR 1.11*[MM1.4]   - Cr improved from baseline[MM1.2]    Obstructive sleep apnea. The patient does not use CPAP at  baseline. However, did have intermittent difficulty breathing postoperatively, improved with DuoNeb treatment. She can have supplemental oxygen as needed overnight to assist with saturations and she also will have albuterol p.r.n. for any further wheezing or difficulty breathing.[MM1.1]     Anxiety   - Resumed PTA ativan daily with PRN @ HS.[MM1.2]     Deep venous thrombosis prophylaxis. PCDs as per primary service.     Amy Terrell  [phone] (p)  Text Page (7AM - 6PM)     Interval History[MM1.1]   Doing well, just concerned about getting her PTA ativan. States she has done very well with that. Tolerating fluids but hasn't advanced to regular diet yet. Just had a small BM.[MM1.3]     -Data reviewed today: I reviewed all new labs and imaging results over the last 24 hours. I personally reviewed[MM1.1] no images or EKG's today[MM1.3].    Physical Exam   Temp: 99.6  F (37.6  C) Temp src: Axillary BP: 158/53   Heart Rate: 72 Resp: 16 SpO2: 96 % O2 Device: Nasal cannula Oxygen Delivery: 1 LPM  Vitals:    05/02/17 0717   Weight: 96.2 kg (212 lb)     Vital Signs with Ranges  Temp:  [98.2  F (36.8  C)-100  F (37.8  C)] 99.6  F (37.6  C)  Heart Rate:  [64-77] 72  Resp:  [12-20] 16  BP: (119-160)/(53-92) 158/53  FiO2 (%):  [50 %-60 %] 50 %  SpO2:  [86 %-100 %] 96 %  I/O last 3 completed shifts:  In: 3750 [I.V.:3750]  Out: 1320 [Urine:1150; Drains:70; Blood:100]    Constitutional:[MM1.1] Appears comfortable,[MM1.3] NAD,   Neuropsyche:  alert and oriented, answers questions appropriately.[MM1.1]   HEENT: Neck brace in place.[MM1.3]   Respiratory:  Breathing comfortably, good air exchange, no wheezes, no crackles.   Cardiovascular:  Regular rate and rhythm, no edema.  GI:  soft, NT/ND, BS normal  Skin/Integumen:  No acute rash or sign of bleeding.     Medications   All medications reviewed on[MM1.1] 05/03/17[MM1.3]     NaCl 75 mL/hr at 05/03/17 0609       fexofenadine (ALLEGRA) tablet 180 mg  180 mg Oral Daily      FLUoxetine (PROzac) capsule 40 mg  40 mg Oral Every Other Day     FLUoxetine (PROzac) capsule 80 mg  80 mg Oral Every Other Day     hydrALAZINE (APRESOLINE) tablet 100 mg  100 mg Oral TID     levothyroxine (SYNTHROID/LEVOTHROID) tablet 100 mcg  100 mcg Oral Daily     metoprolol (LOPRESSOR) tablet 25 mg  25 mg Oral BID     omeprazole (priLOSEC) CR capsule 20 mg  20 mg Oral Daily     polyethylene glycol  17 g Oral Daily     potassium chloride SA (K-DUR/KLOR-CON M) CR tablet 20 mEq  20 mEq Oral BID w/meals     pravastatin (PRAVACHOL) tablet 20 mg  20 mg Oral At Bedtime     torsemide (DEMADEX) tablet 40 mg  40 mg Oral BID     cholecalciferol  5,000 Units Oral Daily     sodium chloride (PF)  3 mL Intracatheter Q8H     insulin aspart  1-10 Units Subcutaneous TID AC     insulin aspart  1-7 Units Subcutaneous At Bedtime     insulin glargine  35 Units Subcutaneous QAM       Data     Recent Labs  Lab 05/02/17  0715   POTASSIUM 4.2   *       Recent Results (from the past 24 hour(s))   XR Cervical Spine Port 1 View    Narrative    XR CERVICAL SPINE PORT 1 VW 5/2/2017 10:10 AM    COMPARISON: 4/20/2017    HISTORY: Intraoperative during cervical spinal surgery.      Impression    IMPRESSION: Surgical hardware at the C5-6 level. No fractures are  seen.    BETTE DAWSON   XR Cervical Spine Port 1 View    Narrative    CERVICAL SPINE PORTABLE ONE VIEW   5/2/2017 12:13 PM     HISTORY: Intraoperative film.    COMPARISON: Intraoperative views same day.      Impression    IMPRESSION: Anterior cervical fusion involving C4, C5, and C6 with  interbody prostheses. Alignment appears anatomic. Endotracheal tube  remains in place.    SONU LEON MD       '[MM1.1]     Revision History        User Key Date/Time User Provider Type Action    > MM1.3 5/3/2017  2:36 PM Amy Terrell MD Physician Sign     MM1.4 5/3/2017  2:26 PM Amy Terrell MD Physician      MM1.2 5/3/2017  2:20 PM Amy Terrell MD Physician      MM1.1 5/3/2017   8:18 AM Amy Terrell MD Physician             Progress Notes by Loree Mcneil at 5/3/2017 10:52 AM     Author:  Loree Mcneil Service:  Spiritual Health Author Type:      Filed:  5/3/2017 11:01 AM Date of Service:  5/3/2017 10:52 AM Note Created:  5/3/2017 10:52 AM    Status:  Signed :  Loree Mcneil ()         SPIRITUAL HEALTH SERVICES  Spiritual Assessment Progress Note  [facility]      PRIMARY FOCUS:      Support for coping    ILLNESS CIRCUMSTANCES:    Reviewed documentation. Reflective conversation shared with patient  which integrated elements of illness and family narratives.        Context of Serious Illness/Symptom(s) - Patient reports that she has cervical surgery yesterday.     Resources for Support -  Patient has 3 adult sons who all live in the [facility]. Has 6 grandchildren as well.   .   DISTRESS:      Emotional, Existential/Relational Distress - Patient talked of losing her  23 years ago, having another long term relationship that also ended with his death.  Loss of a daughter at 15 mos of age.  Relationships that are less than ideal with one of her sons. Patient reports that she does worry and has anxiety.    Spiritual/Sikhism Distress - Patient has been very involved in engaged encounter and other roles with Zoroastrianism but she is feeling less connected now.  Still has a strong spirituality but not as much religiosity.     Social/Cultural/Economic Distress- Patient will be going to [facility] home when she leaves here.  Lives in [place] high rise apartment near there.       SPIRITUAL/Zoroastrian COPING:      Jewish/Guadalupe - Church     Spiritual Practice(s) - Prays daily and has a strong spiritual guadalupe that has supported her through multiple surgeries.    Emotional, Relational, Existential Connections - Family and guadalupe      GOALS OF CARE:    Goals of Care - Patient states that she already has a room reserved at [facility].  She has been there  before so is fine with going there again.    Meaning/Sense-Making - Patient is doing well and is trying to turn her worry over to God.       PLAN:No need for SH to follow but EM will come M-F.        Loree Gant Resident  Pager [phone]                        [MS1.1]          Revision History        User Key Date/Time User Provider Type Action    > MS1.1 5/3/2017 11:01 AM Loree Mcneil Sign            Progress Notes by Lindsay Grimes PT at 5/3/2017  9:03 AM     Author:  Lindsay Grimes PT Service:  (none) Author Type:  Physical Therapist    Filed:  5/3/2017  9:03 AM Date of Service:  5/3/2017  9:03 AM Note Created:  5/3/2017  9:03 AM    Status:  Signed :  Lindsay Grimes PT (Physical Therapist)          05/03/17 0800   Quick Adds   Type of Visit Initial PT Evaluation   Living Environment   Lives With alone   Living Arrangements independent living facility   Home Accessibility bed and bath on same level   Number of Stairs to Enter Home 0   Number of Stairs Within Home 0   Transportation Available family or friend will provide  (pt's neighbors provide transportation)   Living Environment Comment Pt's neighbors assist, has offered to get groceries for pt recently   Self-Care   Dominant Hand right   Usual Activity Tolerance moderate   Current Activity Tolerance fair   Regular Exercise no   Equipment Currently Used at Home walker, rolling;raised toilet;shower chair;grab bar   Activity/Exercise/Self-Care Comment Pt recently began home services (pt thinks HHRN, HHPT, HHOT, and HHA)   Functional Level Prior   Ambulation 1-->assistive equipment   Transferring 0-->independent   Toileting 1-->assistive equipment   Bathing 1-->assistive equipment   Dressing 0-->independent   Eating 0-->independent   Communication 0-->understands/communicates without difficulty   Swallowing 0-->swallows foods/liquids without difficulty   Cognition 0 - no cognition issues reported   Fall history  "within last six months yes   Number of times patient has fallen within last six months 4  (\"The left foot gives out\")   Which of the above functional risks had a recent onset or change? ambulation;transferring;toileting;bathing;dressing   Prior Functional Level Comment Pt reports modified independence wiht 4WW for functional mobility. Pt independent with ADLs/IADLs, pt states her neighbor has been assisting with groceries.    General Information   Onset of Illness/Injury or Date of Surgery - Date 05/02/17   Referring Physician  Rambo Martin MD    Patient/Family Goals Statement Pt hopeful for TCU   Pertinent History of Current Problem (include personal factors and/or comorbidities that impact the POC) Pt is a 75yo female POD#1 s/p C5 corpectomy, decompression of the spinal cord and bilateral C5 and C6 nerves, and C4-6 anterior spinal fusion.   Precautions/Limitations fall precautions;oxygen therapy device and L/min;spinal precautions   Weight-Bearing Status - LLE weight-bearing as tolerated   Weight-Bearing Status - RLE weight-bearing as tolerated   General Observations Pt resting in bed with RN present, soft collar on. Pt with IV, murillo, contnuous pulse ox, on 2L O2 via NC, DALY drain   General Info Comments Activity: Ambulate. Up ad syd POD1, Up to chair 3 times daily   Cognitive Status Examination   Orientation orientation to person, place and time   Level of Consciousness alert   Follows Commands and Answers Questions 100% of the time;able to follow multistep instructions   Personal Safety and Judgment intact   Memory intact   Pain Assessment   Patient Currently in Pain (5/10 L anterior neck)   Integumentary/Edema   Integumentary/Edema Comments Incision and DALY patent anteiror neck   Posture    Posture Forward head position;Protracted shoulders   Range of Motion (ROM)   ROM Comment B LEs WNL for functional mobility. UE PROM WNL   Strength   Strength Comments B LEs WFL for functional mobility with AD. B UEs " "weak L>R, (was prior to admission), pt states improving but some weakness/numbness persists   Bed Mobility   Bed Mobility Comments Ann-Marie supine>sit via log roll   Transfer Skills   Transfer Comments Ann-Marie sit>stand to FWW   Gait   Gait Comments Ann-Marie gait 5' with FWW, second assist for equipment management. Short shuffled steps, decreased toe clearance B, flexed posture   Balance   Balance Comments Fairly good seated balance, fair standing balance   Sensory Examination   Sensory Perception Comments Pt reports numbness in BUEs L>R, denies LE numbness/tingling   Coordination   Coordination Comments Gross motor coordination appears WNL   Muscle Tone   Muscle Tone no deficits were identified   General Therapy Interventions   Planned Therapy Interventions balance training;bed mobility training;gait training;neuromuscular re-education;ROM;strengthening;stretching;transfer training;progressive activity/exercise   Clinical Impression   Criteria for Skilled Therapeutic Intervention yes, treatment indicated   PT Diagnosis Impaired gait   Influenced by the following impairments Pain, weakness, spinal precautions, decreased ROM, decreased functional activity tolerance, decreased balance   Functional limitations due to impairments Decreased safety and independence with functional transfers, gait   Clinical Presentation Stable/Uncomplicated   Clinical Presentation Rationale clinically improving, tolerated activity well   Clinical Decision Making (Complexity) Low complexity   Therapy Frequency` 2 times/day   Predicted Duration of Therapy Intervention (days/wks) 3 days   Anticipated Discharge Disposition Acute Rehabilitation Facility;Transitional Care Facility   Risk & Benefits of therapy have been explained Yes   Patient, Family & other staff in agreement with plan of care Yes   [facility] AM-PAC TM \"6 Clicks\"   2016, Trustees of [facility], under license to [employer].  All rights reserved.   6 Clicks Short Forms " "Basic Mobility Inpatient Short Form   [facility] AM-PAC  \"6 Clicks\" V.2 Basic Mobility Inpatient Short Form   1. Turning from your back to your side while in a flat bed without using bedrails? 3 - A Little   2. Moving from lying on your back to sitting on the side of a flat bed without using bedrails? 3 - A Little   3. Moving to and from a bed to a chair (including a wheelchair)? 3 - A Little   4. Standing up from a chair using your arms (e.g., wheelchair, or bedside chair)? 3 - A Little   5. To walk in hospital room? 3 - A Little   6. Climbing 3-5 steps with a railing? 2 - A Lot   Basic Mobility Raw Score (Score out of 24.Lower scores equate to lower levels of function) 17   Total Evaluation Time   Total Evaluation Time (Minutes) 12[KJ1.1]        Revision History        User Key Date/Time User Provider Type Action    > KJ1.1 5/3/2017  9:03 AM Lindsay Grimes, PT Physical Therapist Sign            Progress Notes by Rambo Martin MD at 5/3/2017  7:08 AM     Author:  Rambo Martin MD Service:  Orthopedics Author Type:  Physician    Filed:  5/3/2017  7:12 AM Date of Service:  5/3/2017  7:08 AM Note Created:  5/3/2017  7:08 AM    Status:  Signed :  Rambo Martin MD (Physician)         History:   Minimal c/o.  Pain control on IV Dilaudid, nursing staff has not tried Tylenol 3. Tolerating sips of water denies coughing or throat clearing.  Preop symptoms of numbness in bilateral upper extremities stable.  Is very pleased that she did not have any cramping in her right arm or leg last night as she does typically.  Has not been out of bed yet  Vitals:   B/P: 146/54, T: 99.3, P: Data Unavailable, R: 16    Intake/Output Summary (Last 24 hours) at 05/03/17 0708  Last data filed at 05/03/17 0609   Gross per 24 hour   Intake             2800 ml   Output             1320 ml   Net             1480 ml          No results found for: NA No results found for: CHLORIDE No results found for: BUN "   Lab Results   Component Value Date    POTASSIUM 4.2 05/02/2017    No results found for: CO2 No results found for: CR     No results found for: WBC, HGB, HCT, MCV, PLT     Results for orders placed or performed during the hospital encounter of 05/02/17 (from the past 24 hour(s))   ABO/Rh type and screen   Result Value Ref Range    ABO AB     RH(D)  Pos     Antibody Screen Neg     Test Valid Only At [facility]     Specimen Expires 05/05/2017    Vitamin D   Result Value Ref Range    Vitamin D Deficiency screening 45 20 - 75 ug/L   Potassium   Result Value Ref Range    Potassium 4.2 3.4 - 5.3 mmol/L   Glucose   Result Value Ref Range    Glucose 164 (H) 70 - 99 mg/dL   XR Cervical Spine Port 1 View    Narrative    XR CERVICAL SPINE PORT 1 VW 5/2/2017 10:10 AM    COMPARISON: 4/20/2017    HISTORY: Intraoperative during cervical spinal surgery.      Impression    IMPRESSION: Surgical hardware at the C5-6 level. No fractures are  seen.    BETTE DAWSON   Glucose by meter   Result Value Ref Range    Glucose 159 (H) 70 - 99 mg/dL   XR Cervical Spine Port 1 View    Narrative    CERVICAL SPINE PORTABLE ONE VIEW   5/2/2017 12:13 PM     HISTORY: Intraoperative film.    COMPARISON: Intraoperative views same day.      Impression    IMPRESSION: Anterior cervical fusion involving C4, C5, and C6 with  interbody prostheses. Alignment appears anatomic. Endotracheal tube  remains in place.    SONU LEON MD   Glucose by meter   Result Value Ref Range    Glucose 178 (H) 70 - 99 mg/dL   Glucose by meter   Result Value Ref Range    Glucose 176 (H) 70 - 99 mg/dL   Glucose by meter   Result Value Ref Range    Glucose 139 (H) 70 - 99 mg/dL   Glucose by meter   Result Value Ref Range    Glucose 141 (H) 70 - 99 mg/dL     DALY output 10cc since MN, serosanguineous.  Dressing:   Dry and intact. Brace/collar intact and well fitted.   Neuro:   Motor: 5/5   Sensation: Decreased to light touch globally in upper extremities as  preop     A/P:   Stable POD # one  Mobilize.  D/c IV  meds try oral dilaudid.  Plan transfer to rehabilitation on Friday[JD1.1]       Revision History        User Key Date/Time User Provider Type Action    > JD1.1 5/3/2017  7:12 AM Rambo Martin MD Physician Sign                  Procedure Notes     No notes of this type exist for this encounter.         Progress Notes - Therapies (Notes from 05/03/17 through 05/06/17)      Progress Notes by Lindsay Grimes PT at 5/3/2017  9:03 AM     Author:  Lindsay Grimes PT Service:  (none) Author Type:  Physical Therapist    Filed:  5/3/2017  9:03 AM Date of Service:  5/3/2017  9:03 AM Note Created:  5/3/2017  9:03 AM    Status:  Signed :  Lindsay Grimes PT (Physical Therapist)          05/03/17 0800   Quick Adds   Type of Visit Initial PT Evaluation   Living Environment   Lives With alone   Living Arrangements independent living facility   Home Accessibility bed and bath on same level   Number of Stairs to Enter Home 0   Number of Stairs Within Home 0   Transportation Available family or friend will provide  (pt's neighbors provide transportation)   Living Environment Comment Pt's neighbors assist, has offered to get groceries for pt recently   Self-Care   Dominant Hand right   Usual Activity Tolerance moderate   Current Activity Tolerance fair   Regular Exercise no   Equipment Currently Used at Home walker, rolling;raised toilet;shower chair;grab bar   Activity/Exercise/Self-Care Comment Pt recently began home services (pt thinks HHRN, HHPT, HHOT, and HHA)   Functional Level Prior   Ambulation 1-->assistive equipment   Transferring 0-->independent   Toileting 1-->assistive equipment   Bathing 1-->assistive equipment   Dressing 0-->independent   Eating 0-->independent   Communication 0-->understands/communicates without difficulty   Swallowing 0-->swallows foods/liquids without difficulty   Cognition 0 - no cognition issues reported   Fall history within  "last six months yes   Number of times patient has fallen within last six months 4  (\"The left foot gives out\")   Which of the above functional risks had a recent onset or change? ambulation;transferring;toileting;bathing;dressing   Prior Functional Level Comment Pt reports modified independence wiht 4WW for functional mobility. Pt independent with ADLs/IADLs, pt states her neighbor has been assisting with groceries.    General Information   Onset of Illness/Injury or Date of Surgery - Date 05/02/17   Referring Physician  Rambo Martin MD    Patient/Family Goals Statement Pt hopeful for TCU   Pertinent History of Current Problem (include personal factors and/or comorbidities that impact the POC) Pt is a 77yo female POD#1 s/p C5 corpectomy, decompression of the spinal cord and bilateral C5 and C6 nerves, and C4-6 anterior spinal fusion.   Precautions/Limitations fall precautions;oxygen therapy device and L/min;spinal precautions   Weight-Bearing Status - LLE weight-bearing as tolerated   Weight-Bearing Status - RLE weight-bearing as tolerated   General Observations Pt resting in bed with RN present, soft collar on. Pt with IV, murillo, contnuous pulse ox, on 2L O2 via NC, DALY drain   General Info Comments Activity: Ambulate. Up ad syd POD1, Up to chair 3 times daily   Cognitive Status Examination   Orientation orientation to person, place and time   Level of Consciousness alert   Follows Commands and Answers Questions 100% of the time;able to follow multistep instructions   Personal Safety and Judgment intact   Memory intact   Pain Assessment   Patient Currently in Pain (5/10 L anterior neck)   Integumentary/Edema   Integumentary/Edema Comments Incision and DALY patent anteiror neck   Posture    Posture Forward head position;Protracted shoulders   Range of Motion (ROM)   ROM Comment B LEs WNL for functional mobility. UE PROM WNL   Strength   Strength Comments B LEs WFL for functional mobility with AD. B UEs weak " "L>R, (was prior to admission), pt states improving but some weakness/numbness persists   Bed Mobility   Bed Mobility Comments Ann-Marie supine>sit via log roll   Transfer Skills   Transfer Comments Ann-Marie sit>stand to FWW   Gait   Gait Comments Ann-Marie gait 5' with FWW, second assist for equipment management. Short shuffled steps, decreased toe clearance B, flexed posture   Balance   Balance Comments Fairly good seated balance, fair standing balance   Sensory Examination   Sensory Perception Comments Pt reports numbness in BUEs L>R, denies LE numbness/tingling   Coordination   Coordination Comments Gross motor coordination appears WNL   Muscle Tone   Muscle Tone no deficits were identified   General Therapy Interventions   Planned Therapy Interventions balance training;bed mobility training;gait training;neuromuscular re-education;ROM;strengthening;stretching;transfer training;progressive activity/exercise   Clinical Impression   Criteria for Skilled Therapeutic Intervention yes, treatment indicated   PT Diagnosis Impaired gait   Influenced by the following impairments Pain, weakness, spinal precautions, decreased ROM, decreased functional activity tolerance, decreased balance   Functional limitations due to impairments Decreased safety and independence with functional transfers, gait   Clinical Presentation Stable/Uncomplicated   Clinical Presentation Rationale clinically improving, tolerated activity well   Clinical Decision Making (Complexity) Low complexity   Therapy Frequency` 2 times/day   Predicted Duration of Therapy Intervention (days/wks) 3 days   Anticipated Discharge Disposition Acute Rehabilitation Facility;Transitional Care Facility   Risk & Benefits of therapy have been explained Yes   Patient, Family & other staff in agreement with plan of care Yes   [facility] AM-PAC TM \"6 Clicks\"   2016, Trustees of [facility], under license to [employer].  All rights reserved.   6 Clicks Short Forms Basic " "Mobility Inpatient Short Form   [facility] AM-PAC  \"6 Clicks\" V.2 Basic Mobility Inpatient Short Form   1. Turning from your back to your side while in a flat bed without using bedrails? 3 - A Little   2. Moving from lying on your back to sitting on the side of a flat bed without using bedrails? 3 - A Little   3. Moving to and from a bed to a chair (including a wheelchair)? 3 - A Little   4. Standing up from a chair using your arms (e.g., wheelchair, or bedside chair)? 3 - A Little   5. To walk in hospital room? 3 - A Little   6. Climbing 3-5 steps with a railing? 2 - A Lot   Basic Mobility Raw Score (Score out of 24.Lower scores equate to lower levels of function) 17   Total Evaluation Time   Total Evaluation Time (Minutes) 12[KJ1.1]        Revision History        User Key Date/Time User Provider Type Action    > KJ1.1 5/3/2017  9:03 AM Lindsay Grimes, PT Physical Therapist Sign            "

## 2017-05-02 NOTE — ANESTHESIA PREPROCEDURE EVALUATION
Anesthesia Evaluation     . Pt has had prior anesthetic.     No history of anesthetic complications          ROS/MED HX    ENT/Pulmonary:     (+)sleep apnea, Intermittent asthma uses CPAP , . .    Neurologic: Comment: Restless leg syndrome    (+)CVA date: 2000 without deficits    Cardiovascular:     (+) Dyslipidemia, hypertension----. : . . . :. .      (-) CAD   METS/Exercise Tolerance:     Hematologic:     (+) Anemia, -      Musculoskeletal:   (+) arthritis, , , -       GI/Hepatic:     (+) GERD      (-) liver disease   Renal/Genitourinary:     (+) chronic renal disease, type: CRI,       Endo:     (+) type II DM Diabetic complications: nephropathy neuropathy, thyroid problem hypothyroidism, Obesity, .      Psychiatric:     (+) psychiatric history anxiety and depression      Infectious Disease:         Malignancy:         Other: Comment: Procedure: Procedure(s):  FUSION CERVICAL ANTERIOR TWO LEVELS  Preop diagnosis: CERVICAL SPINAL STENOSIS WITH MYELOPATHY      -- Adhesive Tape -- Other (See Comments)    --  Burning---paper tape OK   -- Animal Dander     --  congestion   -- Dust Mites -- Other (See Comments)    --  Runny nose   -- Nsaids -- Other (See Comments)    --  Edema   -- Oxycodone     --   hallucinations   -- Sulfa Drugs -- Hives   -- Clindamycin -- Rash  Past Medical History:  No date: Anxiety and depression  No date: Arthritis  2013: Cerebral artery occlusion with cerebral infarc*      Comment: small left pariet-occipital infarct  No date: Cervical stenosis of spine  No date: Chronic edema  No date: Diabetes (H)      Comment: type 2  No date: Dyslipidemia  No date: Frequent falls  No date: Gastroesophageal reflux disease  No date: Hypertension  No date: Hypothyroidism  No date: Iron deficiency anemia  No date: Left leg cellulitis  No date: Numbness and tingling  No date: Obesity  No date: LUISA (obstructive sleep apnea)  No date: Osteopenia  No date: Physical deconditioning  No date: Renal disease       Comment: CKD- stage 3  No date: RLS (restless legs syndrome)  No date: Uncomplicated asthma  No date: Wound infection  Past Surgical History:  No date: CHOLECYSTECTOMY  No date: EYE SURGERY  No date: GYN SURGERY  No date: ORTHOPEDIC SURGERY      Comment: TKA and  shoulders  Prior to Admission medications :  Medication Acetaminophen (TYLENOL PO), Sig Take 1,300 mg by mouth daily as needed for mild pain or fever, Start Date , End Date , Taking? Yes, Authorizing Provider Reported, Patient    Medication LORazepam (ATIVAN PO), Sig Take 0.5 mg by mouth daily, Start Date , End Date , Taking? Yes, Authorizing Provider Reported, Patient    Medication VITAMIN D, CHOLECALCIFEROL, PO, Sig Take 5,000 Units by mouth daily (takes 5 x 1000 unit tablet = 5000unit dose), Start Date , End Date , Taking? Yes, Authorizing Provider Reported, Patient    Medication Acetaminophen (TYLENOL PO), Sig Take 1,300 mg by mouth 2 times daily , Start Date , End Date , Taking? Yes, Authorizing Provider Reported, Patient    Medication albuterol (PROAIR HFA/PROVENTIL HFA/VENTOLIN HFA) 108 (90 BASE) MCG/ACT Inhaler, Sig Inhale 2 puffs into the lungs every 4 hours as needed for shortness of breath / dyspnea or wheezing, Start Date , End Date , Taking? Yes, Authorizing Provider Reported, Patient    Medication ASPIRIN PO, Sig Take 325 mg by mouth At Bedtime, Start Date , End Date , Taking? Yes, Authorizing Provider Reported, Patient    Medication Fexofenadine HCl (ALLEGRA PO), Sig Take 180 mg by mouth daily, Start Date , End Date , Taking? Yes, Authorizing Provider Reported, Patient    Medication FLUoxetine HCl (PROZAC PO), Sig Take 40 mg by mouth every other day On Even Days, Start Date , End Date , Taking? Yes, Authorizing Provider Reported, Patient    Medication FLUoxetine HCl (PROZAC PO), Sig Take 80 mg by mouth every other day On Odd Days (Takes 2 x 40mg tablet = 80mg dose), Start Date , End Date , Taking? Yes, Authorizing Provider Reported,  Patient    Medication HYDRALAZINE HCL PO, Sig Take 100 mg by mouth 3 times daily, Start Date , End Date , Taking? Yes, Authorizing Provider Reported, Patient    Medication insulin aspart (NOVOLOG FLEXPEN) 100 UNIT/ML injection, Sig Inject 7-12 Units Subcutaneous 3 times daily (with meals) If BG is over 170 - sliding scale, Start Date , End Date , Taking? Yes, Authorizing Provider Reported, Patient    Medication insulin glargine (LANTUS) 100 UNIT/ML injection, Sig Inject 35 Units Subcutaneous every morning, Start Date , End Date , Taking? Yes, Authorizing Provider Reported, Patient    Medication LEVOTHYROXINE SODIUM PO, Sig Take 100 mcg by mouth daily, Start Date , End Date , Taking? Yes, Authorizing Provider Reported, Patient    Medication Loperamide HCl (IMODIUM A-D PO), Sig Take 2 mg by mouth every 4 hours as needed, Start Date , End Date , Taking? Yes, Authorizing Provider Reported, Patient    Medication LORazepam (ATIVAN PO), Sig Take 0.5 mg by mouth nightly as needed for anxiety , Start Date , End Date , Taking? Yes, Authorizing Provider Reported, Patient    Medication MELATONIN PO, Sig Take 10 mg by mouth At Bedtime (takes 2 x 5mg tablet = 10mg dose), Start Date , End Date , Taking? Yes, Authorizing Provider Reported, Patient    Medication METOPROLOL TARTRATE PO, Sig Take 25 mg by mouth 2 times daily, Start Date , End Date , Taking? Yes, Authorizing Provider Reported, Patient    Medication OMEPRAZOLE PO, Sig Take 20 mg by mouth daily, Start Date , End Date , Taking? Yes, Authorizing Provider Reported, Patient    Medication Polyethylene Glycol 3350 (MIRALAX PO), Sig Take 17 g by mouth daily as needed, Start Date , End Date , Taking? Yes, Authorizing Provider Reported, Patient    Medication Potassium Chloride Crissy CR (K-DUR PO), Sig Take 20 mEq by mouth 2 times daily (Takes 2 x 10mEq = 20mEq dose), Start Date , End Date , Taking? Yes, Authorizing Provider Reported, Patient    Medication Pramipexole  Dihydrochloride (MIRAPEX PO), Sig Take 0.5 mg by mouth nightly as needed (restless legs), Start Date , End Date , Taking? Yes, Authorizing Provider Reported, Patient    Medication PRAVASTATIN SODIUM PO, Sig Take 20 mg by mouth At Bedtime, Start Date , End Date , Taking? Yes, Authorizing Provider Reported, Patient    Medication TORSEMIDE PO, Sig Take 40 mg by mouth 2 times daily (Takes 2 x 20mg tablet = 40mg dose), Start Date , End Date , Taking? Yes, Authorizing Provider Reported, Patient    Medication NONFORMULARY, Sig Take 1-2 capsules by mouth 3 times daily as needed (Irritable Bowel Syndrome) OTC: IBgard, Start Date , End Date , Taking? Yes, Authorizing Provider Reported, Patient    Medication acetaminophen-codeine (TYLENOL #3) 300-30 MG per tablet, Sig Take 0.5 tablets by mouth 2 times daily, Start Date , End Date , Taking? , Authorizing Provider Reported, Patient      Current Facility-Administered Medications Ordered in Epic:  ceFAZolin sodium-dextrose (ANCEF) infusion 2 g, 2 g, Intravenous, Pre-Op/Pre-procedure x 1 dose  ceFAZolin (ANCEF) 1 g vial to attach to  ml bag for ADULT or 50 ml bag for PEDS, 1 g, Intravenous, See Admin Instructions  lidocaine 1 % 1 mL, 1 mL, Other, Q1H PRN  sodium chloride (PF) 0.9% PF flush 3 mL, 3 mL, Intracatheter, Q1H PRN  lactated ringers infusion, , Intravenous, Continuous, Last Rate: 25 mL/hr at 05/02/17 0744    No current [facility]-ordered outpatient prescriptions on file.    Wt Readings from Last 1 Encounters:  05/02/17 : 96.2 kg (212 lb)  Temp Readings from Last 1 Encounters:  05/02/17 : 36.6  C (97.9  F) (Temporal)  BP Readings from Last 6 Encounters:  05/02/17 : 127/54  Pulse Readings from Last 4 Encounters:  No data found for Pulse  Resp Readings from Last 1 Encounters:  05/02/17 : 18  SpO2 Readings from Last 1 Encounters:  05/02/17 : 95%    Lab Test        05/02/17                       0715          POTASSIUM    4.2           GLC          164*          No results for  input(s): WBC, HGB, PLT in the last 85423 hours.  No results for input(s): INR in the last 81284 hours.    Invalid input(s): APTT   RECENT LABS:   ECG:   ECHO:   CXR:                      Physical Exam  Normal systems: cardiovascular, pulmonary and dental    Airway   Mallampati: III  TM distance: >3 FB  Neck ROM: full    Dental     Cardiovascular       Pulmonary                     Anesthesia Plan      History & Physical Review  History and physical reviewed and following examination; no interval change.    ASA Status:  3 .    NPO Status:  > 8 hours    Plan for General and ETT with Intravenous induction. Maintenance will be Balanced.    PONV prophylaxis:  Ondansetron (or other 5HT-3) and Dexamethasone or Solumedrol  Zofran, compazine      Postoperative Care  Postoperative pain management:  Multi-modal analgesia.      Consents  Anesthetic plan, risks, benefits and alternatives discussed with:  Patient..                          .

## 2017-05-02 NOTE — IP AVS SNAPSHOT
"[place] [address] [facility]: [phone]                                              INTERAGENCY TRANSFER FORM - PHYSICIAN ORDERS   2017                    Hospital Admission Date: 2017  ALBINA SAXENA   : 1940  Sex: Female        Attending Provider: Rambo Martin MD     Allergies:  Adhesive Tape, Animal Dander, Dust Mites, Nsaids, Oxycodone, Sulfa Drugs, Clindamycin    Infection:  None   Service:  SURGERY    Ht:  1.6 m (5' 3\")   Wt:  95.4 kg (210 lb 5.1 oz)   Admission Wt:  96.2 kg (212 lb)    BMI:  37.26 kg/m 2   BSA:  2.06 m 2            Patient PCP Information     Provider PCP Type    Bartolome Clark MD General      ED Clinical Impression     Diagnosis Description Comment Added By Time Added    Cervical disc disease with myelopathy [M50.00] Cervical disc disease with myelopathy [M50.00]  Rambo Maritn MD 2017  8:08 AM    Psoriasis [L40.9] Psoriasis [L40.9]  Amy Terrell MD 2017 12:14 PM    JOSE (generalized anxiety disorder) [F41.1] JOSE (generalized anxiety disorder) [F41.1]  Amy Terrell MD 2017 12:20 PM      Hospital Problems as of 2017              Priority Class Noted POA    Cervical disc disease with myelopathy Medium  2017 Yes      Non-Hospital Problems as of 2017     None      Code Status History     Date Active Date Inactive Code Status Order ID Comments User Context    2017  8:18 AM  Full Code [id number]  Rambo Martin MD Outpatient    2017  5:03 PM 2017  8:18 AM Full Code [id number]  Rambo Martin MD Inpatient         Medication Review      START taking        Dose / Directions Comments    clobetasol 0.05 % cream   Commonly known as:  TEMOVATE   Used for:  Psoriasis        Apply topically 2 times daily   Refills:  0        HYDROmorphone 2 MG tablet   Commonly known as:  DILAUDID        Dose:  1-2 mg   Take 0.5-1 tablets (1-2 mg) by mouth every 4 hours as needed for moderate to severe pain "   Quantity:  30 tablet   Refills:  0          CONTINUE these medications which may have CHANGED, or have new prescriptions. If we are uncertain of the size of tablets/capsules you have at home, strength may be listed as something that might have changed.        Dose / Directions Comments    * ATIVAN PO   This may have changed:  Another medication with the same name was changed. Make sure you understand how and when to take each.        Dose:  0.5 mg   Take 0.5 mg by mouth nightly as needed for anxiety   Refills:  0        * LORazepam 0.5 MG tablet   Commonly known as:  ATIVAN   This may have changed:    - medication strength  - when to take this  - reasons to take this   Used for:  JOSE (generalized anxiety disorder)        Dose:  0.5 mg   Take 1 tablet (0.5 mg) by mouth daily as needed   Quantity:  20 tablet   Refills:  0        * Notice:  This list has 2 medication(s) that are the same as other medications prescribed for you. Read the directions carefully, and ask your doctor or other care provider to review them with you.      CONTINUE these medications which have NOT CHANGED        Dose / Directions Comments    albuterol 108 (90 BASE) MCG/ACT Inhaler   Commonly known as:  PROAIR HFA/PROVENTIL HFA/VENTOLIN HFA        Dose:  2 puff   Inhale 2 puffs into the lungs every 4 hours as needed for shortness of breath / dyspnea or wheezing   Refills:  0        ALLEGRA PO        Dose:  180 mg   Take 180 mg by mouth daily   Refills:  0        ASPIRIN PO        Dose:  325 mg   Take 325 mg by mouth At Bedtime   Refills:  0        HYDRALAZINE HCL PO        Dose:  100 mg   Take 100 mg by mouth 3 times daily   Refills:  0        IMODIUM A-D PO        Dose:  2 mg   Take 2 mg by mouth every 4 hours as needed   Refills:  0        insulin glargine 100 UNIT/ML injection   Commonly known as:  LANTUS        Dose:  35 Units   Inject 35 Units Subcutaneous every morning   Refills:  0        K-DUR PO        Dose:  20 mEq   Take 20 mEq by  mouth 2 times daily (Takes 2 x 10mEq = 20mEq dose)   Refills:  0        LEVOTHYROXINE SODIUM PO        Dose:  100 mcg   Take 100 mcg by mouth daily   Refills:  0        MELATONIN PO        Dose:  10 mg   Take 10 mg by mouth At Bedtime (takes 2 x 5mg tablet = 10mg dose)   Refills:  0        METOPROLOL TARTRATE PO        Dose:  25 mg   Take 25 mg by mouth 2 times daily   Refills:  0        MIRALAX PO        Dose:  17 g   Take 17 g by mouth daily as needed   Refills:  0        MIRAPEX PO        Dose:  0.5 mg   Take 0.5 mg by mouth nightly as needed (restless legs)   Refills:  0        NONFORMULARY        Dose:  1-2 capsule   Take 1-2 capsules by mouth 3 times daily as needed (Irritable Bowel Syndrome) OTC: IBgard   Refills:  0        NovoLOG FLEXPEN 100 UNIT/ML injection   Generic drug:  insulin aspart        Dose:  7-12 Units   Inject 7-12 Units Subcutaneous 3 times daily (with meals) If BG is over 170 - sliding scale   Refills:  0        OMEPRAZOLE PO        Dose:  20 mg   Take 20 mg by mouth daily   Refills:  0        PRAVASTATIN SODIUM PO        Dose:  20 mg   Take 20 mg by mouth At Bedtime   Refills:  0        * PROZAC PO        Dose:  40 mg   Take 40 mg by mouth every other day On Even Days   Refills:  0        * PROZAC PO        Dose:  80 mg   Take 80 mg by mouth every other day On Odd Days (Takes 2 x 40mg tablet = 80mg dose)   Refills:  0        TORSEMIDE PO        Dose:  40 mg   Take 40 mg by mouth 2 times daily (Takes 2 x 20mg tablet = 40mg dose)   Refills:  0        * TYLENOL PO        Dose:  1300 mg   Take 1,300 mg by mouth daily as needed for mild pain or fever   Refills:  0        * TYLENOL PO        Dose:  1300 mg   Take 1,300 mg by mouth 2 times daily   Refills:  0        VITAMIN D (CHOLECALCIFEROL) PO        Dose:  5000 Units   Take 5,000 Units by mouth daily (takes 5 x 1000 unit tablet = 5000unit dose)   Refills:  0        * Notice:  This list has 4 medication(s) that are the same as other medications  prescribed for you. Read the directions carefully, and ask your doctor or other care provider to review them with you.      STOP taking     acetaminophen-codeine 300-30 MG per tablet   Commonly known as:  TYLENOL #3                     Further instructions from your care team       Pt [facility] to [facility] today at 1645 via son.   Encourage use of her CPAP machine at night.     Summary of Visit     Reason for your hospital stay       See discharge summary             After Care     Activity - Ambulate in hallway       Every shift       Activity - Up with assistive device           Activity - Up with nursing assistance           Additional Discharge Instructions       Encourage use of her home CPAP machine with sleep.       Advance Diet as Tolerated       Snacks/Supplements Adult: Ensure Plus Adult Shake; Between Meals   Combination Diet Dysphagia Diet Level 2: Mechan Altered; Nectar Thickened Liquids (pre-thickened or use instant food thickener)       Fall precautions           Follow Up (Transitional Care Management)       Follow up with primary care provider, Bartolome Clark, or TCU provider within 7 days for hospital follow- up.  The following labs/tests are recommended: BMP.  Echocardiogram recommended after discharge from TCU, prior to follow up with her primary care provider.       General info for SNF       Length of Stay Estimate: Short Term Care: Estimated # of Days <30  Condition at Discharge: Improving  Level of care:skilled   Rehabilitation Potential: Good  Admission H&P remains valid and up-to-date: Yes  Recent Chemotherapy: N/A  Use Nursing Home Standing Orders: Yes       Glucose monitor nursing POCT       Before meals and at bedtime       Mantoux instructions       Give two-step Mantoux (PPD) Per Facility Policy Yes       Wound care (specify)       Site:   Anterior neck  Instructions:  Keep wound dry for two more days. May remove steri strips in one week.             Referrals     Occupational  Therapy Adult Consult       Evaluate and treat as clinically indicated.    Reason:  adls       Physical Therapy Adult Consult       Evaluate and treat as clinically indicated.    Reason:  Post op anterior cervical fusion for myelopathy. No neck exercises. rom of bilateral ues.       Speech Language Path Adult Consult       Evaluate and treat as clinically indicated.    Reason:  Anterior cervical surgery.             Statement of Approval     Ordered          05/06/17 1233  I have reviewed and agree with all the recommendations and orders detailed in this document.  EFFECTIVE NOW     Approved and electronically signed by:  Amy Terrell MD           05/06/17 1217  I have reviewed and agree with all the recommendations and orders detailed in this document.  EFFECTIVE NOW     Approved and electronically signed by:  Amy Terrell MD           05/05/17 0818  I have reviewed and agree with all the recommendations and orders detailed in this document.  EFFECTIVE NOW     Approved and electronically signed by:  Rambo Martin MD

## 2017-05-02 NOTE — ANESTHESIA CARE TRANSFER NOTE
Patient: Nevaeh Lopes    Procedure(s):  C5 CORPECTOMY, C4-C6 ANTERIOR SPINAL FUSION USING INTERVERTEBRAL PROSTHESIS, LOCAL AUTOGRAFT AND INSTRUMENTATION (MIDAS AM8, PSR (PROSTHESIS) AND ATLANTIS T (INSTRUMENTATION)  - Wound Class: I-Clean    Diagnosis: CERVICAL SPINAL STENOSIS WITH MYELOPATHY   Diagnosis Additional Information: No value filed.    Anesthesia Type:   General, ETT     Note:  Airway :Face Mask  Patient transferred to:PACU  Comments: Patient spont ventilating. Adequate TV's. Opening eyes to voice and following commands. ETT suctioned and removed without issues. To PACU with O2. Vitals stable. Report given to RN.       Vitals: (Last set prior to Anesthesia Care Transfer)    CRNA VITALS  5/2/2017 1211 - 5/2/2017 1250      5/2/2017             Pulse: 79    SpO2: 100 %    Resp Rate (observed): 17                Electronically Signed By: LOUISE Wylie CRNA  May 2, 2017  12:50 PM

## 2017-05-02 NOTE — IP AVS SNAPSHOT
[facility]    [address]    [place] [address]    Phone:  [phone]                                       After Visit Summary   5/2/2017    Nevaeh Lopes    MRN: [id number]           After Visit Summary Signature Page     I have received my discharge instructions, and my questions have been answered. I have discussed any challenges I see with this plan with the nurse or doctor.    ..........................................................................................................................................  Patient/Patient Representative Signature      ..........................................................................................................................................  Patient Representative Print Name and Relationship to Patient    ..................................................               ................................................  Date                                            Time    ..........................................................................................................................................  Reviewed by Signature/Title    ...................................................              ..............................................  Date                                                            Time

## 2017-05-02 NOTE — OR NURSING
"Spoke with NIURKA Alas. Oxygen saturation 93-94% on 4Ls NC. Plan to continue IS and begin CPAP in PACU before sending pt to floor. Pt has LUISA but tells RN that she does not use her \"sleep machine at home even though I should.\" Will continue to monitor.  "

## 2017-05-02 NOTE — OR NURSING
0800 bruise right lateral back, rt. Lower arm, lt. Lower arm.  Left lower leg mildly edematous and red from past cellulitis.  Rash right groin and under rt. breast

## 2017-05-02 NOTE — PROGRESS NOTES
Admission medication history interview status for the 5/2/2017  admission is complete. See EPIC admission navigator for prior to admission medications     Medication history source reliability:Good    Medication history interview source(s):Patient    Medication history resources (including written lists, pill bottles, clinic record):None    Primary pharmacy.[facility]    Additional medication history information not noted on PTA med list :None    Time spent in this activity: 45 minutes    Prior to Admission medications    Medication Sig Last Dose Taking? Auth Provider   Acetaminophen (TYLENOL PO) Take 1,300 mg by mouth daily as needed for mild pain or fever Past Week at PRN Yes Reported, Patient   LORazepam (ATIVAN PO) Take 0.5 mg by mouth daily 5/1/2017 at AM Yes Reported, Patient   VITAMIN D, CHOLECALCIFEROL, PO Take 5,000 Units by mouth daily (takes 5 x 1000 unit tablet = 5000unit dose) 5/1/2017 at AM Yes Reported, Patient   Acetaminophen (TYLENOL PO) Take 1,300 mg by mouth 2 times daily  5/1/2017 at PM Yes Reported, Patient   albuterol (PROAIR HFA/PROVENTIL HFA/VENTOLIN HFA) 108 (90 BASE) MCG/ACT Inhaler Inhale 2 puffs into the lungs every 4 hours as needed for shortness of breath / dyspnea or wheezing Past Week at PRN Yes Reported, Patient   ASPIRIN PO Take 325 mg by mouth At Bedtime 4/25/2017 at PM Yes Reported, Patient   Fexofenadine HCl (ALLEGRA PO) Take 180 mg by mouth daily 5/1/2017 at AM Yes Reported, Patient   FLUoxetine HCl (PROZAC PO) Take 40 mg by mouth every other day On Even Days 4/30/2017 at AM Yes Reported, Patient   FLUoxetine HCl (PROZAC PO) Take 80 mg by mouth every other day On Odd Days (Takes 2 x 40mg tablet = 80mg dose) 5/1/2017 at AM Yes Reported, Patient   HYDRALAZINE HCL PO Take 100 mg by mouth 3 times daily 5/2/2017 at 0410 Yes Reported, Patient   insulin aspart (NOVOLOG FLEXPEN) 100 UNIT/ML injection Inject 7-12 Units Subcutaneous 3 times daily (with meals) If BG is over 170 - sliding scale  Past Week at Unknown time Yes Reported, Patient   insulin glargine (LANTUS) 100 UNIT/ML injection Inject 35 Units Subcutaneous every morning 5/2/2017 (20 units) at 0410 Yes Reported, Patient   LEVOTHYROXINE SODIUM PO Take 100 mcg by mouth daily 5/2/2017 at 0410 Yes Reported, Patient   Loperamide HCl (IMODIUM A-D PO) Take 2 mg by mouth every 4 hours as needed More than a Month at PRN Yes Reported, Patient   LORazepam (ATIVAN PO) Take 0.5 mg by mouth nightly as needed for anxiety  5/1/2017 at PM Yes Reported, Patient   MELATONIN PO Take 10 mg by mouth At Bedtime (takes 2 x 5mg tablet = 10mg dose) 5/1/2017 at HS Yes Reported, Patient   METOPROLOL TARTRATE PO Take 25 mg by mouth 2 times daily 5/2/2017 at 0410 Yes Reported, Patient   OMEPRAZOLE PO Take 20 mg by mouth daily 5/1/2017 at AM Yes Reported, Patient   Polyethylene Glycol 3350 (MIRALAX PO) Take 17 g by mouth daily as needed More than a Month at PRN Yes Reported, Patient   Potassium Chloride Crissy CR (K-DUR PO) Take 20 mEq by mouth 2 times daily (Takes 2 x 10mEq = 20mEq dose) 5/1/2017 at PM Yes Reported, Patient   Pramipexole Dihydrochloride (MIRAPEX PO) Take 0.5 mg by mouth nightly as needed (restless legs) 5/1/2017 at PM Yes Reported, Patient   PRAVASTATIN SODIUM PO Take 20 mg by mouth At Bedtime 5/1/2017 at PM Yes Reported, Patient   TORSEMIDE PO Take 40 mg by mouth 2 times daily (Takes 2 x 20mg tablet = 40mg dose) 5/1/2017 at PM Yes Reported, Patient   NONFORMULARY Take 1-2 capsules by mouth 3 times daily as needed (Irritable Bowel Syndrome) OTC: IBgard More than a Month at PRN Yes Reported, Patient   acetaminophen-codeine (TYLENOL #3) 300-30 MG per tablet Take 0.5 tablets by mouth 2 times daily 5/1/2017 at PM  Reported, Patient

## 2017-05-03 ENCOUNTER — APPOINTMENT (OUTPATIENT)
Dept: PHYSICAL THERAPY | Facility: CLINIC | Age: 77
DRG: 472 | End: 2017-05-03
Attending: ORTHOPAEDIC SURGERY
Payer: MEDICARE

## 2017-05-03 LAB
ANION GAP SERPL CALCULATED.3IONS-SCNC: 5 MMOL/L (ref 3–14)
BUN SERPL-MCNC: 21 MG/DL (ref 7–30)
CALCIUM SERPL-MCNC: 8.1 MG/DL (ref 8.5–10.1)
CHLORIDE SERPL-SCNC: 109 MMOL/L (ref 94–109)
CO2 SERPL-SCNC: 30 MMOL/L (ref 20–32)
CREAT SERPL-MCNC: 1.11 MG/DL (ref 0.52–1.04)
GFR SERPL CREATININE-BSD FRML MDRD: 48 ML/MIN/1.7M2
GLUCOSE BLDC GLUCOMTR-MCNC: 109 MG/DL (ref 70–99)
GLUCOSE BLDC GLUCOMTR-MCNC: 113 MG/DL (ref 70–99)
GLUCOSE BLDC GLUCOMTR-MCNC: 134 MG/DL (ref 70–99)
GLUCOSE BLDC GLUCOMTR-MCNC: 141 MG/DL (ref 70–99)
GLUCOSE BLDC GLUCOMTR-MCNC: 169 MG/DL (ref 70–99)
GLUCOSE SERPL-MCNC: 146 MG/DL (ref 70–99)
HGB BLD-MCNC: 11.1 G/DL (ref 11.7–15.7)
POTASSIUM SERPL-SCNC: 4.6 MMOL/L (ref 3.4–5.3)
SODIUM SERPL-SCNC: 144 MMOL/L (ref 133–144)

## 2017-05-03 PROCEDURE — 97530 THERAPEUTIC ACTIVITIES: CPT | Mod: GP | Performed by: PHYSICAL THERAPIST

## 2017-05-03 PROCEDURE — 25000132 ZZH RX MED GY IP 250 OP 250 PS 637: Mod: GY | Performed by: ORTHOPAEDIC SURGERY

## 2017-05-03 PROCEDURE — 00000146 ZZHCL STATISTIC GLUCOSE BY METER IP

## 2017-05-03 PROCEDURE — 25000131 ZZH RX MED GY IP 250 OP 636 PS 637: Mod: GY | Performed by: ORTHOPAEDIC SURGERY

## 2017-05-03 PROCEDURE — 80048 BASIC METABOLIC PNL TOTAL CA: CPT | Performed by: ORTHOPAEDIC SURGERY

## 2017-05-03 PROCEDURE — 40000193 ZZH STATISTIC PT WARD VISIT: Performed by: PHYSICAL THERAPIST

## 2017-05-03 PROCEDURE — 99233 SBSQ HOSP IP/OBS HIGH 50: CPT | Performed by: INTERNAL MEDICINE

## 2017-05-03 PROCEDURE — 85018 HEMOGLOBIN: CPT | Performed by: ORTHOPAEDIC SURGERY

## 2017-05-03 PROCEDURE — 99207 ZZC CDG-MDM COMPONENT: MEETS MODERATE - UP CODED: CPT | Performed by: INTERNAL MEDICINE

## 2017-05-03 PROCEDURE — A9270 NON-COVERED ITEM OR SERVICE: HCPCS | Mod: GY | Performed by: INTERNAL MEDICINE

## 2017-05-03 PROCEDURE — 12000007 ZZH R&B INTERMEDIATE

## 2017-05-03 PROCEDURE — 36415 COLL VENOUS BLD VENIPUNCTURE: CPT | Performed by: ORTHOPAEDIC SURGERY

## 2017-05-03 PROCEDURE — 25000131 ZZH RX MED GY IP 250 OP 636 PS 637: Mod: GY | Performed by: PHYSICIAN ASSISTANT

## 2017-05-03 PROCEDURE — 97116 GAIT TRAINING THERAPY: CPT | Mod: GP | Performed by: PHYSICAL THERAPIST

## 2017-05-03 PROCEDURE — 25000132 ZZH RX MED GY IP 250 OP 250 PS 637: Mod: GY | Performed by: PHYSICIAN ASSISTANT

## 2017-05-03 PROCEDURE — 25000132 ZZH RX MED GY IP 250 OP 250 PS 637: Mod: GY | Performed by: INTERNAL MEDICINE

## 2017-05-03 PROCEDURE — A9270 NON-COVERED ITEM OR SERVICE: HCPCS | Mod: GY | Performed by: PHYSICIAN ASSISTANT

## 2017-05-03 PROCEDURE — 25000128 H RX IP 250 OP 636: Performed by: ORTHOPAEDIC SURGERY

## 2017-05-03 PROCEDURE — A9270 NON-COVERED ITEM OR SERVICE: HCPCS | Mod: GY | Performed by: ORTHOPAEDIC SURGERY

## 2017-05-03 PROCEDURE — 97161 PT EVAL LOW COMPLEX 20 MIN: CPT | Mod: GP | Performed by: PHYSICAL THERAPIST

## 2017-05-03 PROCEDURE — 25000128 H RX IP 250 OP 636: Performed by: INTERNAL MEDICINE

## 2017-05-03 RX ORDER — HYDROMORPHONE HYDROCHLORIDE 2 MG/1
2-4 TABLET ORAL
Status: DISCONTINUED | OUTPATIENT
Start: 2017-05-03 | End: 2017-05-04

## 2017-05-03 RX ORDER — ACETAMINOPHEN 650 MG/1
650 SUPPOSITORY RECTAL EVERY 4 HOURS PRN
Status: DISCONTINUED | OUTPATIENT
Start: 2017-05-03 | End: 2017-05-06 | Stop reason: HOSPADM

## 2017-05-03 RX ORDER — LORAZEPAM 0.5 MG/1
0.5 TABLET ORAL DAILY
Status: DISCONTINUED | OUTPATIENT
Start: 2017-05-03 | End: 2017-05-04

## 2017-05-03 RX ORDER — ACETAMINOPHEN 325 MG/1
650 TABLET ORAL EVERY 4 HOURS PRN
Status: DISCONTINUED | OUTPATIENT
Start: 2017-05-03 | End: 2017-05-06 | Stop reason: HOSPADM

## 2017-05-03 RX ADMIN — HYDROMORPHONE HYDROCHLORIDE 0.5 MG: 1 INJECTION, SOLUTION INTRAMUSCULAR; INTRAVENOUS; SUBCUTANEOUS at 01:01

## 2017-05-03 RX ADMIN — POLYETHYLENE GLYCOL 3350 17 G: 17 POWDER, FOR SOLUTION ORAL at 09:26

## 2017-05-03 RX ADMIN — METOPROLOL TARTRATE 25 MG: 25 TABLET, FILM COATED ORAL at 09:26

## 2017-05-03 RX ADMIN — HYDROMORPHONE HYDROCHLORIDE 2 MG: 2 TABLET ORAL at 13:22

## 2017-05-03 RX ADMIN — CHOLECALCIFEROL CAP 125 MCG (5000 UNIT) 5000 UNITS: 125 CAP at 09:26

## 2017-05-03 RX ADMIN — TORSEMIDE 40 MG: 10 TABLET ORAL at 09:26

## 2017-05-03 RX ADMIN — HYDROMORPHONE HYDROCHLORIDE 2 MG: 2 TABLET ORAL at 20:53

## 2017-05-03 RX ADMIN — OMEPRAZOLE 20 MG: 20 CAPSULE, DELAYED RELEASE ORAL at 09:26

## 2017-05-03 RX ADMIN — TORSEMIDE 40 MG: 10 TABLET ORAL at 16:18

## 2017-05-03 RX ADMIN — CEFAZOLIN SODIUM 2 G: 2 INJECTION, SOLUTION INTRAVENOUS at 01:01

## 2017-05-03 RX ADMIN — FEXOFENADINE HYDROCHLORIDE 180 MG: 180 TABLET, FILM COATED ORAL at 09:26

## 2017-05-03 RX ADMIN — HYDROMORPHONE HYDROCHLORIDE 0.5 MG: 1 INJECTION, SOLUTION INTRAMUSCULAR; INTRAVENOUS; SUBCUTANEOUS at 06:13

## 2017-05-03 RX ADMIN — POTASSIUM CHLORIDE 20 MEQ: 1500 TABLET, EXTENDED RELEASE ORAL at 09:26

## 2017-05-03 RX ADMIN — INSULIN GLARGINE 35 UNITS: 100 INJECTION, SOLUTION SUBCUTANEOUS at 09:32

## 2017-05-03 RX ADMIN — HYDRALAZINE HYDROCHLORIDE 100 MG: 50 TABLET ORAL at 16:19

## 2017-05-03 RX ADMIN — Medication 2 LOZENGE: at 06:15

## 2017-05-03 RX ADMIN — HYDROMORPHONE HYDROCHLORIDE 2 MG: 2 TABLET ORAL at 23:03

## 2017-05-03 RX ADMIN — PRAVASTATIN SODIUM 20 MG: 20 TABLET ORAL at 23:03

## 2017-05-03 RX ADMIN — POTASSIUM CHLORIDE 20 MEQ: 1500 TABLET, EXTENDED RELEASE ORAL at 18:17

## 2017-05-03 RX ADMIN — LORAZEPAM 0.5 MG: 0.5 TABLET ORAL at 23:03

## 2017-05-03 RX ADMIN — HYDROMORPHONE HYDROCHLORIDE 2 MG: 2 TABLET ORAL at 17:47

## 2017-05-03 RX ADMIN — METOPROLOL TARTRATE 25 MG: 25 TABLET, FILM COATED ORAL at 20:53

## 2017-05-03 RX ADMIN — HYDROMORPHONE HYDROCHLORIDE 2 MG: 2 TABLET ORAL at 11:16

## 2017-05-03 RX ADMIN — SODIUM CHLORIDE: 9 INJECTION, SOLUTION INTRAVENOUS at 09:22

## 2017-05-03 RX ADMIN — FLUOXETINE 80 MG: 20 CAPSULE ORAL at 09:26

## 2017-05-03 RX ADMIN — HYDROMORPHONE HYDROCHLORIDE 0.5 MG: 1 INJECTION, SOLUTION INTRAMUSCULAR; INTRAVENOUS; SUBCUTANEOUS at 04:00

## 2017-05-03 RX ADMIN — HYDRALAZINE HYDROCHLORIDE 100 MG: 50 TABLET ORAL at 09:26

## 2017-05-03 RX ADMIN — SODIUM CHLORIDE: 9 INJECTION, SOLUTION INTRAVENOUS at 22:06

## 2017-05-03 RX ADMIN — LORAZEPAM 0.5 MG: 0.5 TABLET ORAL at 14:42

## 2017-05-03 RX ADMIN — LEVOTHYROXINE SODIUM 100 MCG: 100 TABLET ORAL at 09:26

## 2017-05-03 RX ADMIN — INSULIN ASPART 2 UNITS: 100 INJECTION, SOLUTION INTRAVENOUS; SUBCUTANEOUS at 14:44

## 2017-05-03 RX ADMIN — HYDROMORPHONE HYDROCHLORIDE 2 MG: 2 TABLET ORAL at 08:10

## 2017-05-03 RX ADMIN — HYDRALAZINE HYDROCHLORIDE 100 MG: 50 TABLET ORAL at 23:03

## 2017-05-03 NOTE — PROGRESS NOTES
" 05/03/17 0800   Quick Adds   Type of Visit Initial PT Evaluation   Living Environment   Lives With alone   Living Arrangements independent living facility   Home Accessibility bed and bath on same level   Number of Stairs to Enter Home 0   Number of Stairs Within Home 0   Transportation Available family or friend will provide  (pt's neighbors provide transportation)   Living Environment Comment Pt's neighbors assist, has offered to get groceries for pt recently   Self-Care   Dominant Hand right   Usual Activity Tolerance moderate   Current Activity Tolerance fair   Regular Exercise no   Equipment Currently Used at Home walker, rolling;raised toilet;shower chair;grab bar   Activity/Exercise/Self-Care Comment Pt recently began home services (pt thinks HHRN, HHPT, HHOT, and HHA)   Functional Level Prior   Ambulation 1-->assistive equipment   Transferring 0-->independent   Toileting 1-->assistive equipment   Bathing 1-->assistive equipment   Dressing 0-->independent   Eating 0-->independent   Communication 0-->understands/communicates without difficulty   Swallowing 0-->swallows foods/liquids without difficulty   Cognition 0 - no cognition issues reported   Fall history within last six months yes   Number of times patient has fallen within last six months 4  (\"The left foot gives out\")   Which of the above functional risks had a recent onset or change? ambulation;transferring;toileting;bathing;dressing   Prior Functional Level Comment Pt reports modified independence wiht 4WW for functional mobility. Pt independent with ADLs/IADLs, pt states her neighbor has been assisting with groceries.    General Information   Onset of Illness/Injury or Date of Surgery - Date 05/02/17   Referring Physician  Rambo Martin MD    Patient/Family Goals Statement Pt hopeful for TCU   Pertinent History of Current Problem (include personal factors and/or comorbidities that impact the POC) Pt is a 75yo female POD#1 s/p C5 " corpectomy, decompression of the spinal cord and bilateral C5 and C6 nerves, and C4-6 anterior spinal fusion.   Precautions/Limitations fall precautions;oxygen therapy device and L/min;spinal precautions   Weight-Bearing Status - LLE weight-bearing as tolerated   Weight-Bearing Status - RLE weight-bearing as tolerated   General Observations Pt resting in bed with RN present, soft collar on. Pt with IV, murillo, contnuous pulse ox, on 2L O2 via NC, DALY drain   General Info Comments Activity: Ambulate. Up ad syd POD1, Up to chair 3 times daily   Cognitive Status Examination   Orientation orientation to person, place and time   Level of Consciousness alert   Follows Commands and Answers Questions 100% of the time;able to follow multistep instructions   Personal Safety and Judgment intact   Memory intact   Pain Assessment   Patient Currently in Pain (5/10 L anterior neck)   Integumentary/Edema   Integumentary/Edema Comments Incision and DALY patent anteiror neck   Posture    Posture Forward head position;Protracted shoulders   Range of Motion (ROM)   ROM Comment B LEs WNL for functional mobility. UE PROM WNL   Strength   Strength Comments B LEs WFL for functional mobility with AD. B UEs weak L>R, (was prior to admission), pt states improving but some weakness/numbness persists   Bed Mobility   Bed Mobility Comments Ann-Marie supine>sit via log roll   Transfer Skills   Transfer Comments Ann-Marie sit>stand to FWW   Gait   Gait Comments Ann-Marie gait 5' with FWW, second assist for equipment management. Short shuffled steps, decreased toe clearance B, flexed posture   Balance   Balance Comments Fairly good seated balance, fair standing balance   Sensory Examination   Sensory Perception Comments Pt reports numbness in BUEs L>R, denies LE numbness/tingling   Coordination   Coordination Comments Gross motor coordination appears WNL   Muscle Tone   Muscle Tone no deficits were identified   General Therapy Interventions   Planned Therapy  "Interventions balance training;bed mobility training;gait training;neuromuscular re-education;ROM;strengthening;stretching;transfer training;progressive activity/exercise   Clinical Impression   Criteria for Skilled Therapeutic Intervention yes, treatment indicated   PT Diagnosis Impaired gait   Influenced by the following impairments Pain, weakness, spinal precautions, decreased ROM, decreased functional activity tolerance, decreased balance   Functional limitations due to impairments Decreased safety and independence with functional transfers, gait   Clinical Presentation Stable/Uncomplicated   Clinical Presentation Rationale clinically improving, tolerated activity well   Clinical Decision Making (Complexity) Low complexity   Therapy Frequency` 2 times/day   Predicted Duration of Therapy Intervention (days/wks) 3 days   Anticipated Discharge Disposition Acute Rehabilitation Facility;Transitional Care Facility   Risk & Benefits of therapy have been explained Yes   Patient, Family & other staff in agreement with plan of care Yes   [facility] TM \"6 Clicks\"   2016, [employer] of [employer], under license to [employer].  All rights reserved.   6 Clicks Short Forms Basic Mobility Inpatient Short Form   [facility]  \"6 Clicks\" V.2 Basic Mobility Inpatient Short Form   1. Turning from your back to your side while in a flat bed without using bedrails? 3 - A Little   2. Moving from lying on your back to sitting on the side of a flat bed without using bedrails? 3 - A Little   3. Moving to and from a bed to a chair (including a wheelchair)? 3 - A Little   4. Standing up from a chair using your arms (e.g., wheelchair, or bedside chair)? 3 - A Little   5. To walk in hospital room? 3 - A Little   6. Climbing 3-5 steps with a railing? 2 - A Lot   Basic Mobility Raw Score (Score out of 24.Lower scores equate to lower levels of function) 17   Total Evaluation Time   Total Evaluation Time (Minutes) " 12

## 2017-05-03 NOTE — PLAN OF CARE
Problem: Goal Outcome Summary  Goal: Goal Outcome Summary  PT: PT orders received, evaluation completed, treatment initiated. Pt is a 75yo female POD#1 s/p C5 corpectomy, decompression of the spinal cord and bilateral C5 and C6 nerves, and C4-6 anterior spinal fusion. Pt lives alone at an independent living facility with elevator access and walk-in shower, RTS, shower chair. Pt reports modified independence with 4WW for functional mobility. Pt reports 4 falls in past 6 months.     Pt reports her pre-op BUE numbness/weakness is improving, however, L weaker than R. Pain 5/10. Soft collar on at all times. Pt on 2L O2 via NC, trialed on RA but pt desaturates to 87%. Pt able to maintain >90% on 1L O2 for activity. Pt requires Ann-Marie for bed mobility via log roll, Ann-Marie for functional transfers to FWW, Ann-Marie of 1 for 40' gait with FWW. Shortened shuffled steps B, Pt having some difficulty maintaining UE weight bearing through LUE. Educated pt regarding spinal precautions, log roll, proper postures and body mechanics; handout given. Encouraged pt to ambulate with nursing 4x/day to improve functional activity tolerance. Pt would benefit from continued IP PT intervention to progress functional mobility. Pt may be a good candidate for ARU due to residual weakness and impaired mobility, her previous level of independence and her motivation to return home. If pt does not meet criteria, would recommend TCU.

## 2017-05-03 NOTE — ANESTHESIA POSTPROCEDURE EVALUATION
Patient: Nevaeh Lopes    Procedure(s):  C5 CORPECTOMY, C4-C6 ANTERIOR SPINAL FUSION USING INTERVERTEBRAL PROSTHESIS, LOCAL AUTOGRAFT AND INSTRUMENTATION (MIDAS AM8, PSR (PROSTHESIS) AND ATLANTIS T (INSTRUMENTATION)  - Wound Class: I-Clean    Diagnosis:CERVICAL SPINAL STENOSIS WITH MYELOPATHY   Diagnosis Additional Information: No value filed.    Anesthesia Type:  General, ETT    Note:  Anesthesia Post Evaluation    Patient location during evaluation: PACU  Patient participation: Able to fully participate in evaluation  Level of consciousness: responsive to verbal stimuli and sleepy but conscious  Pain management: adequate  Airway patency: patent  Cardiovascular status: acceptable  Respiratory status: acceptable  Hydration status: acceptable  PONV: none     Anesthetic complications: None          Last vitals:  Vitals:    05/03/17 1322 05/03/17 1416 05/03/17 1606   BP:   155/54   Resp: 16 16    Temp:   37.2  C (99  F)   SpO2:   98%         Electronically Signed By: López Weaver MD  May 3, 2017  5:51 PM

## 2017-05-03 NOTE — PLAN OF CARE
Problem: Goal Outcome Summary  Goal: Goal Outcome Summary  Outcome: Improving  A/Ox4.  C/o neck ache and HA.  States controlled with rest, dilaudid po.  Tyl prn ordered this afternoon but pt did not feel need for it.  LS dim bases +fine crackles.  Encourage CDB and IS.  Tolerating diet.  Fair appetite.  Did not want diet advanced.  Denies nausea.  Marinelli d/c'd at 1124, output 500 ml.  Ambulates Ax1 walker and belt.  Generalized weakness.  LLE weakness, states baseline.  Denies N/T this AM.  Upon reassessment, pt states BUE/BLE N/T is baseline.  Neck incision dressing CDI, brace on.  DALY output 25 ml.  VSS except 158/53.  Recheck 147/57.  T max 100.0 (A).  Recheck 98.9 (O).  Pleasant, cooperative.  Feeling anxious this afternoon.  Ativan 0.5 mg admin with improvement.  PCO reviewed with pt.  Questions/concerns answered.

## 2017-05-03 NOTE — PLAN OF CARE
Problem: Goal Outcome Summary  Goal: Goal Outcome Summary  A&OX4.VSS.pain manged with iv dilaudid.Numbness in BUE Lt worse than Rt.swelling and pain in LLE due to cellulities.mild nausea improving.Anterior dressing CDI.Soft collar on all the time.murillo and yaya intact.

## 2017-05-03 NOTE — PROGRESS NOTES
Care Transition Initial Assessment -   Reason For Consult: discharge planning  Met with: Patient , she likes to be called DEWAYNE   Active Problems:    Cervical disc disease with myelopathy         DATA  Lives With: alone  Living Arrangements: independent living facility  Description of Support System: Supportive  Who is your support system?: Children, Other (specify) (friend)  Pt states that she has recently re-connected with her sister and that they speak every day. She has six children.   Identified issues/concerns regarding health management: Pt lives alone. She has some  Friends, Nadege and Sudhakar who assist her with transportation and groceries. She has several adult children. Pt has had home care in the past but does not remember which agency. Pt has applied for PCA assistance but does not qualify. Pt has had multiple admissions to [facility] in [place]  ( [phone] fax: [phone]) and is pre-registered there for transitional care. ELENA contacted [facility] and left a voice message to confirm pre-registration. Angela in admissions returned call and confirmed that they would have a bed for pt on Friday.    Transportation Available: family or friend will provide (pt's neighbors provide transportation)    ASSESSMENT  Cognitive Status:  awake and alert  Concerns to be addressed:    PLAN  Financial costs for the patient includes N/A  Patient given options and choices for discharge: Yes. [facility] and ARU discussed  Patient/family is agreeable to the plan?  Yes:   Patient anticipates discharging to: [facility].

## 2017-05-03 NOTE — PROGRESS NOTES
History:   Minimal c/o.  Pain control on IV Dilaudid, nursing staff has not tried Tylenol 3. Tolerating sips of water denies coughing or throat clearing.  Preop symptoms of numbness in bilateral upper extremities stable.  Is very pleased that she did not have any cramping in her right arm or leg last night as she does typically.  Has not been out of bed yet  Vitals:   B/P: 146/54, T: 99.3, P: Data Unavailable, R: 16    Intake/Output Summary (Last 24 hours) at 05/03/17 0708  Last data filed at 05/03/17 0609   Gross per 24 hour   Intake             2800 ml   Output             1320 ml   Net             1480 ml          No results found for: NA No results found for: CHLORIDE No results found for: BUN   Lab Results   Component Value Date    POTASSIUM 4.2 05/02/2017    No results found for: CO2 No results found for: CR     No results found for: WBC, HGB, HCT, MCV, PLT     Results for orders placed or performed during the hospital encounter of 05/02/17 (from the past 24 hour(s))   ABO/Rh type and screen   Result Value Ref Range    ABO AB     RH(D)  Pos     Antibody Screen Neg     Test Valid Only At [facility]     Specimen Expires 05/05/2017    Vitamin D   Result Value Ref Range    Vitamin D Deficiency screening 45 20 - 75 ug/L   Potassium   Result Value Ref Range    Potassium 4.2 3.4 - 5.3 mmol/L   Glucose   Result Value Ref Range    Glucose 164 (H) 70 - 99 mg/dL   XR Cervical Spine Port 1 View    Narrative    XR CERVICAL SPINE PORT 1 VW 5/2/2017 10:10 AM    COMPARISON: 4/20/2017    HISTORY: Intraoperative during cervical spinal surgery.      Impression    IMPRESSION: Surgical hardware at the C5-6 level. No fractures are  seen.    BETTE DAWSON   Glucose by meter   Result Value Ref Range    Glucose 159 (H) 70 - 99 mg/dL   XR Cervical Spine Port 1 View    Narrative    CERVICAL SPINE PORTABLE ONE VIEW   5/2/2017 12:13 PM     HISTORY: Intraoperative film.    COMPARISON: Intraoperative views same day.       Impression    IMPRESSION: Anterior cervical fusion involving C4, C5, and C6 with  interbody prostheses. Alignment appears anatomic. Endotracheal tube  remains in place.    SONU LEON MD   Glucose by meter   Result Value Ref Range    Glucose 178 (H) 70 - 99 mg/dL   Glucose by meter   Result Value Ref Range    Glucose 176 (H) 70 - 99 mg/dL   Glucose by meter   Result Value Ref Range    Glucose 139 (H) 70 - 99 mg/dL   Glucose by meter   Result Value Ref Range    Glucose 141 (H) 70 - 99 mg/dL     DALY output 10cc since MN, serosanguineous.  Dressing:   Dry and intact. Brace/collar intact and well fitted.   Neuro:   Motor: 5/5   Sensation: Decreased to light touch globally in upper extremities as preop     A/P:   Stable POD # one  Mobilize.  D/c IV  meds try oral dilaudid.  Plan transfer to rehabilitation on Friday

## 2017-05-03 NOTE — PLAN OF CARE
Problem: Goal Outcome Summary  Goal: Goal Outcome Summary  Outcome: Improving  A&O times 4. Bilateral numbness in fingers and toes. Bowel sounds present, not passing gas. On full liquid diet, appetite fair. VSS. Dressing CDI, soft collar in place, DALY drain patent. IVF infusing. Up with assist of one to bedside commode, voiding adequately. C/o neck pain, decreased with oral Dilaudid. Continue to monitor.

## 2017-05-03 NOTE — PLAN OF CARE
"Problem: Goal Outcome Summary  Goal: Goal Outcome Summary  PT: Pt politely declining therapy this afternoon due to poor/no sleep, \"waiting for medications\", and pt reports \"I was up in the chair too long this morning\". Encouraged pt to continue with frequent short distance ambulation and frequent position changes, encouraged pt to limit time up to chair to 30-60min when she gets up this evening. Pt in agreement.      "

## 2017-05-03 NOTE — PLAN OF CARE
Problem: Goal Outcome Summary  Goal: Goal Outcome Summary  OT: Order received and chart reviewed. Eval attempted; however, pt declined due to pain and fatigue from just returning to bed.

## 2017-05-03 NOTE — PROGRESS NOTES
SPIRITUAL HEALTH SERVICES  Spiritual Assessment Progress Note  [facility] 73      PRIMARY FOCUS:      Support for coping    ILLNESS CIRCUMSTANCES:    Reviewed documentation. Reflective conversation shared with patient  which integrated elements of illness and family narratives.        Context of Serious Illness/Symptom(s) - Patient reports that she has cervical surgery yesterday.     Resources for Support -  Patient has 3 adult sons who all live in the [facility]. Has 6 grandchildren as well.   .   DISTRESS:      Emotional, Existential/Relational Distress - Patient talked of losing her  23 years ago, having another long term relationship that also ended with his death.  Loss of a daughter at 15 mos of age.  Relationships that are less than ideal with one of her sons. Patient reports that she does worry and has anxiety.    Spiritual/Lutheran Distress - Patient has been very involved in engaged encounter and other roles with Scientologist but she is feeling less connected now.  Still has a strong spirituality but not as much religiosity.     Social/Cultural/Economic Distress- Patient will be going to [facility] home when she leaves here.  Lives in [place] rise apartment near there.       SPIRITUAL/Anglican COPING:      Restorationism/Guadalupe - Mandaen     Spiritual Practice(s) - Prays daily and has a strong spiritual guadalupe that has supported her through multiple surgeries.    Emotional, Relational, Existential Connections - Family and guadalupe      GOALS OF CARE:    Goals of Care - Patient states that she already has a room reserved at [facility].  She has been there before so is fine with going there again.    Meaning/Sense-Making - Patient is doing well and is trying to turn her worry over to God.       PLAN:No need for  to follow but EM will come MYovanyF.        Loree Mcneil  Chaplain Resident  Pager [phone]

## 2017-05-03 NOTE — PROGRESS NOTES
[facility]    Hospitalist Progress Note    Date of Service (when I saw the patient): 05/03/2017    Assessment & Plan   Nevaeh Lopes is a 76-year-old female with past medical history of diabetes mellitus type 2, hypertension, hyperlipidemia, obstructive sleep apnea, does not use CPAP, anxiety, depression and cervical spinal stenosis who underwent previously scheduled C4 through C6 anterior cervical fusion per Dr. Martin. Hospitalist Service was consulted postoperatively for co-medical management.     Cervical spinal stenosis.   - s/p fusion of C4 through C6 on 05/02/2017. Surgery was performed using general anesthesia. EBL was 100.  - Routine post-operative care, including pain mgt and DVT prophylaxis, per surgical team.   - D/c IVF.     Diabetes mellitus type 2, HgbA1c was 7.2 on 04/25/2017. - continues on PTA lantus 35 units.   - takes sliding scale at home with meals, continued on high SSI QID    Recent Labs  Lab 05/03/17  0802 05/03/17  0605 05/02/17  2241 05/02/17  1754 05/02/17  1300 05/02/17  1129 05/02/17  0715   GLC  --   --   --   --   --   --  164*   * 141* 139* 176* 178* 159*  --        Hypertension.   - Continue PTA hydralazine, metoprolol and torsemide with hold parameters.   - PTA Torsemide and potassium resumed on 5/3.     Gastroesophageal reflux disease.   - Continue PTA  omeprazole.     Hypothyroidism.   - Continue PTA levothyroxine.     Depression.   - Continue PTA fluoxetine.     Chronic kidney disease, stage III. Pre-op 1.40 which appears to be her baseline.     Recent Labs  Lab 05/03/17  0847   CR 1.11*   - Cr improved from baseline    Obstructive sleep apnea. The patient does not use CPAP at baseline. However, did have intermittent difficulty breathing postoperatively, improved with DuoNeb treatment. She can have supplemental oxygen as needed overnight to assist with saturations and she also will have albuterol p.r.n. for any further wheezing or difficulty breathing.      Anxiety   - Resumed PTA ativan daily with PRN @ .     Deep venous thrombosis prophylaxis. PCDs as per primary service.     Amy Terrell  [phone] (p)  Text Page (7AM - 6PM)     Interval History   Doing well, just concerned about getting her PTA ativan. States she has done very well with that. Tolerating fluids but hasn't advanced to regular diet yet. Just had a small BM.     -Data reviewed today: I reviewed all new labs and imaging results over the last 24 hours. I personally reviewed no images or EKG's today.    Physical Exam   Temp: 99.6  F (37.6  C) Temp src: Axillary BP: 158/53   Heart Rate: 72 Resp: 16 SpO2: 96 % O2 Device: Nasal cannula Oxygen Delivery: 1 LPM  Vitals:    05/02/17 0717   Weight: 96.2 kg (212 lb)     Vital Signs with Ranges  Temp:  [98.2  F (36.8  C)-100  F (37.8  C)] 99.6  F (37.6  C)  Heart Rate:  [64-77] 72  Resp:  [12-20] 16  BP: (119-160)/(53-92) 158/53  FiO2 (%):  [50 %-60 %] 50 %  SpO2:  [86 %-100 %] 96 %  I/O last 3 completed shifts:  In: 3750 [I.V.:3750]  Out: 1320 [Urine:1150; Drains:70; Blood:100]    Constitutional: Appears comfortable, NAD,   Neuropsyche:  alert and oriented, answers questions appropriately.   HEENT: Neck brace in place.   Respiratory:  Breathing comfortably, good air exchange, no wheezes, no crackles.   Cardiovascular:  Regular rate and rhythm, no edema.  GI:  soft, NT/ND, BS normal  Skin/Integumen:  No acute rash or sign of bleeding.     Medications   All medications reviewed on 05/03/17     NaCl 75 mL/hr at 05/03/17 0609       fexofenadine (ALLEGRA) tablet 180 mg  180 mg Oral Daily     FLUoxetine (PROzac) capsule 40 mg  40 mg Oral Every Other Day     FLUoxetine (PROzac) capsule 80 mg  80 mg Oral Every Other Day     hydrALAZINE (APRESOLINE) tablet 100 mg  100 mg Oral TID     levothyroxine (SYNTHROID/LEVOTHROID) tablet 100 mcg  100 mcg Oral Daily     metoprolol (LOPRESSOR) tablet 25 mg  25 mg Oral BID     omeprazole (priLOSEC) CR capsule 20 mg  20 mg  Oral Daily     polyethylene glycol  17 g Oral Daily     potassium chloride SA (K-DUR/KLOR-CON M) CR tablet 20 mEq  20 mEq Oral BID w/meals     pravastatin (PRAVACHOL) tablet 20 mg  20 mg Oral At Bedtime     torsemide (DEMADEX) tablet 40 mg  40 mg Oral BID     cholecalciferol  5,000 Units Oral Daily     sodium chloride (PF)  3 mL Intracatheter Q8H     insulin aspart  1-10 Units Subcutaneous TID AC     insulin aspart  1-7 Units Subcutaneous At Bedtime     insulin glargine  35 Units Subcutaneous QAM       Data     Recent Labs  Lab 05/02/17  0715   POTASSIUM 4.2   *       Recent Results (from the past 24 hour(s))   XR Cervical Spine Port 1 View    Narrative    XR CERVICAL SPINE PORT 1 VW 5/2/2017 10:10 AM    COMPARISON: 4/20/2017    HISTORY: Intraoperative during cervical spinal surgery.      Impression    IMPRESSION: Surgical hardware at the C5-6 level. No fractures are  seen.    BETTE DAWSON   XR Cervical Spine Port 1 View    Narrative    CERVICAL SPINE PORTABLE ONE VIEW   5/2/2017 12:13 PM     HISTORY: Intraoperative film.    COMPARISON: Intraoperative views same day.      Impression    IMPRESSION: Anterior cervical fusion involving C4, C5, and C6 with  interbody prostheses. Alignment appears anatomic. Endotracheal tube  remains in place.    SONU LEON MD       '

## 2017-05-03 NOTE — OP NOTE
DATE OF PROCEDURE:  05/02/2017      PREOPERATIVE DIAGNOSIS:  Cervical spinal stenosis with myelopathy.      POSTOPERATIVE DIAGNOSIS:  Cervical spinal stenosis with myelopathy.      PROCEDURES:  C5 corpectomy, decompression of the spinal cord and bilateral C5 and C6 nerves.  C4-C6 anterior spinal fusion using intervertebral prosthesis, local autograft and instrumentation.      SURGEON:  Rambo Martin MD      ASSISTANT:   Leigh Ann Everett PA-C      ANESTHESIA:  General with an endotracheal tube.      INDICATIONS FOR PROCEDURE:  Ms. Nevaeh Lopes is a 76-year-old woman who has a long chronic history of neck pain after falling slipping on the ice in 1993.  She developed bilateral upper extremity numbness and tingling after a shoulder surgery which was done 6 months ago.  Her preoperative imaging studies showed severe disk facet degeneration at multiple levels in the cervical spine which were most significant at the C4-5 level where there were a few millimeters of anterolisthesis.  CT and MRI scans also showed severe degenerative changes at multiple levels with ankylosis from C2-4, with severe central stenosis at the C4-5 and C5-6 levels with high signal within the spinal cord at the C4-5 level and severe foraminal stenosis bilaterally at C5 and C6.  Because of the severity of the stenosis and the high signal within her spinal cord, it was recommended to her that she undergo decompression and fusion.      OPERATIVE PROCEDURE:  After informed consent was obtained from the patient, satisfactory general anesthesia achieved, adhesive tape was used to distract her shoulders distally.  Her neck was prepped and draped in a sterile fashion.  A transverse incision was made from the midline to the left side 2 fingerbreadths above the clavicle at the level of the distal thyroid cartilage.  Subcutaneous dissection continued with the Bovie down to the platysma muscle.  Platysma muscle was isolated along its inferior  surface and divided transversely.  The interval between the sternocleidomastoid muscle and sternothyroid muscle was divided bluntly.  The interval between the trachea and esophagus and carotid sheath was divided bluntly.  The precervical fascia was divided.  A 22-gauge spinal needle was placed in the C4-C5 disc space and a lateral x-ray obtained.  It was difficult to count the vertebra.  It appeared to be at the 4-5 level and the x-ray was read by Dr. Jacobo Steinberg, who agreed that it was at the 4-5 level.  The dissection continued subperiosteally taking the longus colli muscles off the C4-5 and C6 vertebral bodies.  Trimline retractors were placed at the C4-5 level.  [place] distraction pins were placed in the C4 and 5 vertebral bodies and distraction was placed across the disc space.  Morris elevator, curets and pituitaries were used to remove the entirety of the C4-5 disc from uncovertebral joint to uncovertebral joint and all the way back to the posterior longitudinal ligament.  The distraction pin was taken out of C4,  the retractors were placed at the C5-6 level and distraction pin was placed in the C6 vertebral body, distraction placed across the C5-6 disc space.  The annulus was cut with the Bovie and curets and pituitaries were used to remove the entirety of the C5-6 disc from uncovertebral joint to uncovertebral joint and all the way back to the posterior longitudinal ligament.  The distraction pin was removed from C5 and placed back at C4 and the retractors were removed directly adjacent to the C5 vertebral body, distraction was placed across the C4-C6 segment and the Leksell rongeur was used to remove the majority of the C5 vertebral body.  All bone removed with the corpectomy was morcellized and saved for later use as graft.  The majority had been removed, the Midas drill was brought in to further thin the lamina and grind out the uncovertebral spurs bilaterally at C5 and C6.  Entry was gained into  the spinal canal by teasing a nerve hook through the posterior longitudinal ligament at the 4-5 level and then micro Kerrison rongeurs were used to remove the entirety of ligamentum flavum.  The rongeur was used to undercut the posterior inferior corner of C4 and the posterior superior corner of C6 and to perform foraminotomies bilaterally at C5 and C6.  After the decompression, a nerve hook could be passed out into the C5 and C6 foramen bilaterally and there was no evidence of any further tethering material.  The bone spurs that were in the spinal cord were completely removed as well.  The endplates were made parallel and a 23 mm PSR implant was assembled on the back table and filled with the local autograft was placed into the corpectomy defect.  Distraction was taken off of the 4-6 segment, capturing the prosthesis and then 40 mm Atlantis T plate was selected and placed onto the C4 and C6 vertebral bodies using 13 mm screws.  The purchase of the screws was excellent.  A lateral x-ray was obtained showing good position of the implant at the hardware at C4-6 and the implant in the C4-C6 space.  The locking mechanisms were deployed.  The wound was irrigated with copious amounts of antibiotic solution.  Additional bone graft was placed bilaterally adjacent to the intervertebral prosthesis.  A 10 Lithuanian DALY drain was placed along the anterior border of the spine and brought out inferior to the incision.  The platysma muscle was reapproximated with multiple simple sutures of 2-0 Vicryl.  The skin closed with running subcuticular stitch of 3-0 Vicryl.  Sterile Steri-Strips and dressing were applied.  The DALY was hooked to a suction bulb.  The patient was placed into a soft cervical collar, awakened, extubated, and left the operating room in good condition.  Estimated blood loss was 100 mL.  Replacement was 2000 mL crystalloid.  There were no complications.  Total time for the procedure from skin to skin was 2 hours and 45  minutes.        CLAUDIA Ellison's assistance was essential during the entire procedure for safe retraction of visceral and neural tissue, suctioning of blood for visualization and holding alignment during placement of the implants and bone graft.       KIMBERLY MARTIN MD             D: 2017 12:31   T: 2017 11:08   MT: [id number]      Name:     ALBINA SAXENA   MRN:      [id number]        Account:        [id number]   :      1940           Procedure Date: 2017      Document: [id number]       cc: Kimberly Martin MD

## 2017-05-03 NOTE — CONSULTS
PRIMARY CARE PROVIDER:  Bartolome Clark MD       REASON FOR CONSULTATION:  Hospitalist consult.      REQUESTING PHYSICIAN:  Dr. Martin.      HISTORY OF PRESENT ILLNESS:  Nevaeh Lopes is a 76-year-old female with past medical history of anxiety, depression, hypertension, obstructive sleep apnea, CVA, CKD, stage III, diabetes mellitus type 2, GERD, asthma and cervical spinal stenosis who underwent a previously scheduled C4-5 anterior cervical fusion this afternoon per Dr. Martin.  Surgery was performed using general anesthesia.  EBL was 100.  Surgery was performed without complication, however, in the immediate postoperative period the patient was found to have coarse breath sounds with increased wheezing and slight hypoxia to 90% oxygen saturation on 4 liters of oxygen.  She was noted to have sleep apnea, however, did not use CPAP.  She was given a DuoNeb with subsequent improvement and was thus transferred to station 73 for ongoing postoperative monitoring.  Hospitalist Service was asked to consult for co-medical management.      The patient presently is evaluated in the hospital bed with RN at bedside.  She currently reports that she feels good.  She does note that she has chronic numbness to her upper extremities, right greater than left.  She does feel that it is slightly improved in the immediate postoperative period, however, bedside RN does note that she has just recently received Dilaudid.  She denies any difficulty breathing at this time.  No chest pain or chest discomfort.  The patient or beside RN has no acute concerns at this time.      PAST MEDICAL HISTORY:   1.  Cervical spinal stenosis.   2.  Depression.   3.  Anxiety.   4.  Diabetes mellitus type 2, last hemoglobin A1c was 7.2 on 04/25/2017.   5.  Osteopenia.   6.  Hypertension.   7.  Obesity with BMI of 37.   8.  Restless leg syndrome.   9.  Obstructive sleep apnea, is prescribed BiPAP at home but does not use it secondary to claustrophobia.   10.   History of a CVA in 2013 and subclinical small left parietooccipital infarct.   11.  Chronic kidney disease, stage III.   12.  Iron deficiency anemia.   13.  Asthma.   14.  Dyslipidemia.   15.  GERD.   16.  Hypothyroidism.      PAST SURGICAL HISTORY:   1.  Right thumb arthroplasty.   2.  Bilateral cataract removal.   3.  Cholecystectomy.   4.  Hysterectomy with bladder repair.   5.  Right shoulder replacement.   6.  Left shoulder replacement.   7.  Left and subsequently right total knee arthroplasties.      PRIOR TO ADMISSION MEDICATIONS:    Prescriptions Prior to Admission   Medication Sig Dispense Refill Last Dose     Acetaminophen (TYLENOL PO) Take 1,300 mg by mouth daily as needed for mild pain or fever   Past Week at PRN     LORazepam (ATIVAN PO) Take 0.5 mg by mouth daily   5/1/2017 at AM     VITAMIN D, CHOLECALCIFEROL, PO Take 5,000 Units by mouth daily (takes 5 x 1000 unit tablet = 5000unit dose)   5/1/2017 at AM     Acetaminophen (TYLENOL PO) Take 1,300 mg by mouth 2 times daily    5/1/2017 at PM     albuterol (PROAIR HFA/PROVENTIL HFA/VENTOLIN HFA) 108 (90 BASE) MCG/ACT Inhaler Inhale 2 puffs into the lungs every 4 hours as needed for shortness of breath / dyspnea or wheezing   Past Week at PRN     ASPIRIN PO Take 325 mg by mouth At Bedtime   4/25/2017 at PM     Fexofenadine HCl (ALLEGRA PO) Take 180 mg by mouth daily   5/1/2017 at AM     FLUoxetine HCl (PROZAC PO) Take 40 mg by mouth every other day On Even Days   4/30/2017 at AM     FLUoxetine HCl (PROZAC PO) Take 80 mg by mouth every other day On Odd Days (Takes 2 x 40mg tablet = 80mg dose)   5/1/2017 at AM     HYDRALAZINE HCL PO Take 100 mg by mouth 3 times daily   5/2/2017 at 0410     insulin aspart (NOVOLOG FLEXPEN) 100 UNIT/ML injection Inject 7-12 Units Subcutaneous 3 times daily (with meals) If BG is over 170 - sliding scale   Past Week at Unknown time     insulin glargine (LANTUS) 100 UNIT/ML injection Inject 35 Units Subcutaneous every morning    5/2/2017 (20 units) at 0410     LEVOTHYROXINE SODIUM PO Take 100 mcg by mouth daily   5/2/2017 at 0410     Loperamide HCl (IMODIUM A-D PO) Take 2 mg by mouth every 4 hours as needed   More than a Month at PRN     LORazepam (ATIVAN PO) Take 0.5 mg by mouth nightly as needed for anxiety    5/1/2017 at PM     MELATONIN PO Take 10 mg by mouth At Bedtime (takes 2 x 5mg tablet = 10mg dose)   5/1/2017 at HS     METOPROLOL TARTRATE PO Take 25 mg by mouth 2 times daily   5/2/2017 at 0410     OMEPRAZOLE PO Take 20 mg by mouth daily   5/1/2017 at AM     Polyethylene Glycol 3350 (MIRALAX PO) Take 17 g by mouth daily as needed   More than a Month at PRN     Potassium Chloride Crissy CR (K-DUR PO) Take 20 mEq by mouth 2 times daily (Takes 2 x 10mEq = 20mEq dose)   5/1/2017 at PM     Pramipexole Dihydrochloride (MIRAPEX PO) Take 0.5 mg by mouth nightly as needed (restless legs)   5/1/2017 at PM     PRAVASTATIN SODIUM PO Take 20 mg by mouth At Bedtime   5/1/2017 at PM     TORSEMIDE PO Take 40 mg by mouth 2 times daily (Takes 2 x 20mg tablet = 40mg dose)   5/1/2017 at PM     NONFORMULARY Take 1-2 capsules by mouth 3 times daily as needed (Irritable Bowel Syndrome) OTC: IBgard   More than a Month at PRN     acetaminophen-codeine (TYLENOL #3) 300-30 MG per tablet Take 0.5 tablets by mouth 2 times daily   5/1/2017 at PM           ALLERGIES:   1.  Clindamycin, reaction is a rash.   2.  NSAIDs causing edema.   3.  Oxycodone causing auditory hallucinations.   4.  Sulfa causing hives.   5.  Tape causing burning of the skin.   6.  Seasonal allergies.       FAMILY HISTORY:  Reviewed and is noncontributory to the current admission.      SOCIAL HISTORY:  The patient presently lives alone.  She is a lifelong nonsmoker.  She does not consume alcohol on a regular basis.      REVIEW OF SYSTEMS:  A 10-point review of systems was performed and is otherwise negative.  Please refer to the HPI.      PHYSICAL EXAMINATION:   VITAL SIGNS:  Temperature 98.7,  heart rate is 70, respiratory rate of 14, blood pressure of 154/65, SpO2 of 95% on 2 liters of oxygen.   GENERAL:  Well-developed, well-nourished female who appears comfortable.   HEENT:  Head is normocephalic.  Pupils are equal, round, reactive to light bilaterally.  EOMs are intact bilaterally.  Nose, mouth are patent.  Mucous membranes are moist.   NECK:  With a brace in place and a DALY drain with serosanguineous fluid.   LUNGS:  Clear to auscultation bilaterally without wheezes or crackles.   CARDIOVASCULAR:  Regular rate and rhythm, normal S1, S2.   ABDOMEN:  Soft, nontender, nondistended, no guarding or rigidity.  Positive bowel sounds in all 4 quadrants.   NEUROLOGIC:   strength is 4/5 bilaterally.  The patient is spontaneously moving bilateral upper and lower extremities.  Plantar flexion and dorsiflexion is 5/5 bilaterally.   SKIN:  Warm and dry, no rash.  There is no pedal edema.      LABORATORY DATA:  Preoperative laboratory studies performed on 04/25/2017 per pre-admit H&P with a BMP with a sodium of 143, potassium of 4.0, chloride 105, bicarbonate 27, BUN of 39, creatinine of 1.43, glucose 222, calcium 9.1.  CBC with hemoglobin of 12.9, platelet of 258, WBC of 6.0.      ASSESSMENT AND PLAN:  Nevaeh Lopes is a 76-year-old female with past medical history of diabetes mellitus type 2, hypertension, hyperlipidemia, obstructive sleep apnea, does not use CPAP, anxiety, depression and cervical spinal stenosis who underwent previously scheduled C4 through C6 anterior cervical fusion per Dr. Martin.  Hospitalist Service was consulted postoperatively for co-medical management.   1.  Cervical spinal stenosis.  The patient is status post anterior cervical fusion of C4 through C6 on 05/02/2017.  Surgery was performed using general anesthesia.  EBL was 100.  Continue postoperative monitoring and management as per the primary service.   2.  Diabetes mellitus type 2, last hemoglobin A1c was 7.2 on 04/25/2017.  The  patient is maintained prior to admission on Lantus 35 units subcutaneously every morning as well as a NovoLog sliding scale with meals.  Her prior to admission Lantus has been resumed as an a.m. dosing at full dose and her NovoLog has been held.  She has been placed on hospital policy high insulin sliding scale with correctional insulin and frequent Accu-Cheks.   3.  Hypertension.  Continue prior to admission hydralazine, metoprolol and torsemide with hold parameters.  Torsemide is to begin in a.m. as patient is presently receiving IV fluids.   4.  Gastroesophageal reflux disease.  Continue prior to admission omeprazole.   5.  Hypothyroidism.  Continue prior to admission levothyroxine.   6.  Depression.  Continue prior to admission fluoxetine.   7.  Chronic kidney disease, stage III.  The patient's creatinine preoperatively was 1.40 which appears to be her baseline.  She will have a repeat BMP in the morning.  Will closely monitor renal function and avoid nephrotoxic medications.   8.  Obstructive sleep apnea.  The patient does not use CPAP at baseline.  However, did have intermittent difficulty breathing postoperatively, improved with DuoNeb treatment.  She can have supplemental oxygen as needed overnight to assist with saturations and she also will have albuterol p.r.n. for any further wheezing or difficulty breathing.   9.  Deep venous thrombosis prophylaxis.  PCDs as per primary service.      We, the Hospitalist Service, thank you for this consultation.      This patient was staffed with Dr. Lb Duke, who independently interviewed and evaluated patient and is agreement with the above-mentioned plan.      LB DUKE MD       As dictated by MARCY ANDERSON PA-C            D: 2017 18:37   T: 2017 19:43   MT: FAUSTINO      Name:     ALBINA SAXENA   MRN:      [id number]        Account:       [id number]   :      1940           Consult Date:  2017      Document: [id number]

## 2017-05-04 ENCOUNTER — APPOINTMENT (OUTPATIENT)
Dept: PHYSICAL THERAPY | Facility: CLINIC | Age: 77
DRG: 472 | End: 2017-05-04
Attending: ORTHOPAEDIC SURGERY
Payer: MEDICARE

## 2017-05-04 LAB
GLUCOSE BLDC GLUCOMTR-MCNC: 104 MG/DL (ref 70–99)
GLUCOSE BLDC GLUCOMTR-MCNC: 121 MG/DL (ref 70–99)
GLUCOSE BLDC GLUCOMTR-MCNC: 170 MG/DL (ref 70–99)
GLUCOSE BLDC GLUCOMTR-MCNC: 219 MG/DL (ref 70–99)
GLUCOSE BLDC GLUCOMTR-MCNC: 234 MG/DL (ref 70–99)

## 2017-05-04 PROCEDURE — 25000131 ZZH RX MED GY IP 250 OP 636 PS 637: Mod: GY | Performed by: ORTHOPAEDIC SURGERY

## 2017-05-04 PROCEDURE — 25000132 ZZH RX MED GY IP 250 OP 250 PS 637: Mod: GY | Performed by: PHYSICIAN ASSISTANT

## 2017-05-04 PROCEDURE — 25000132 ZZH RX MED GY IP 250 OP 250 PS 637: Mod: GY | Performed by: ORTHOPAEDIC SURGERY

## 2017-05-04 PROCEDURE — A9270 NON-COVERED ITEM OR SERVICE: HCPCS | Mod: GY | Performed by: INTERNAL MEDICINE

## 2017-05-04 PROCEDURE — 40000193 ZZH STATISTIC PT WARD VISIT: Performed by: PHYSICAL THERAPIST

## 2017-05-04 PROCEDURE — A9270 NON-COVERED ITEM OR SERVICE: HCPCS | Mod: GY | Performed by: ORTHOPAEDIC SURGERY

## 2017-05-04 PROCEDURE — 99207 ZZC CDG-MDM COMPONENT: MEETS MODERATE - UP CODED: CPT | Performed by: INTERNAL MEDICINE

## 2017-05-04 PROCEDURE — 25000132 ZZH RX MED GY IP 250 OP 250 PS 637: Mod: GY | Performed by: INTERNAL MEDICINE

## 2017-05-04 PROCEDURE — 00000146 ZZHCL STATISTIC GLUCOSE BY METER IP

## 2017-05-04 PROCEDURE — 99233 SBSQ HOSP IP/OBS HIGH 50: CPT | Performed by: INTERNAL MEDICINE

## 2017-05-04 PROCEDURE — 12000007 ZZH R&B INTERMEDIATE

## 2017-05-04 PROCEDURE — 97116 GAIT TRAINING THERAPY: CPT | Mod: GP | Performed by: PHYSICAL THERAPIST

## 2017-05-04 PROCEDURE — 40000894 ZZH STATISTIC OT IP EVAL DEFER

## 2017-05-04 PROCEDURE — 97530 THERAPEUTIC ACTIVITIES: CPT | Mod: GP | Performed by: PHYSICAL THERAPIST

## 2017-05-04 PROCEDURE — A9270 NON-COVERED ITEM OR SERVICE: HCPCS | Mod: GY | Performed by: PHYSICIAN ASSISTANT

## 2017-05-04 RX ORDER — LORAZEPAM 0.5 MG/1
0.5 TABLET ORAL DAILY PRN
Status: DISCONTINUED | OUTPATIENT
Start: 2017-05-04 | End: 2017-05-06 | Stop reason: HOSPADM

## 2017-05-04 RX ORDER — BISACODYL 10 MG
10 SUPPOSITORY, RECTAL RECTAL DAILY PRN
Status: DISCONTINUED | OUTPATIENT
Start: 2017-05-04 | End: 2017-05-06 | Stop reason: HOSPADM

## 2017-05-04 RX ADMIN — HYDRALAZINE HYDROCHLORIDE 100 MG: 50 TABLET ORAL at 20:49

## 2017-05-04 RX ADMIN — LORAZEPAM 0.5 MG: 0.5 TABLET ORAL at 08:55

## 2017-05-04 RX ADMIN — OMEPRAZOLE 20 MG: 20 CAPSULE, DELAYED RELEASE ORAL at 08:55

## 2017-05-04 RX ADMIN — TORSEMIDE 40 MG: 10 TABLET ORAL at 08:56

## 2017-05-04 RX ADMIN — POTASSIUM CHLORIDE 20 MEQ: 1500 TABLET, EXTENDED RELEASE ORAL at 08:55

## 2017-05-04 RX ADMIN — PRAVASTATIN SODIUM 20 MG: 20 TABLET ORAL at 20:50

## 2017-05-04 RX ADMIN — FLUOXETINE 40 MG: 20 CAPSULE ORAL at 08:55

## 2017-05-04 RX ADMIN — LORAZEPAM 0.5 MG: 0.5 TABLET ORAL at 20:50

## 2017-05-04 RX ADMIN — HYDRALAZINE HYDROCHLORIDE 100 MG: 50 TABLET ORAL at 08:56

## 2017-05-04 RX ADMIN — INSULIN ASPART 4 UNITS: 100 INJECTION, SOLUTION INTRAVENOUS; SUBCUTANEOUS at 17:08

## 2017-05-04 RX ADMIN — CHOLECALCIFEROL CAP 125 MCG (5000 UNIT) 5000 UNITS: 125 CAP at 08:56

## 2017-05-04 RX ADMIN — FEXOFENADINE HYDROCHLORIDE 180 MG: 180 TABLET, FILM COATED ORAL at 08:56

## 2017-05-04 RX ADMIN — METOPROLOL TARTRATE 25 MG: 25 TABLET, FILM COATED ORAL at 20:50

## 2017-05-04 RX ADMIN — HYDROMORPHONE HYDROCHLORIDE 4 MG: 2 TABLET ORAL at 05:15

## 2017-05-04 RX ADMIN — POTASSIUM CHLORIDE 20 MEQ: 1500 TABLET, EXTENDED RELEASE ORAL at 16:55

## 2017-05-04 RX ADMIN — HYDROMORPHONE HYDROCHLORIDE 4 MG: 2 TABLET ORAL at 01:58

## 2017-05-04 RX ADMIN — TORSEMIDE 40 MG: 10 TABLET ORAL at 16:55

## 2017-05-04 RX ADMIN — HYDRALAZINE HYDROCHLORIDE 100 MG: 50 TABLET ORAL at 16:55

## 2017-05-04 RX ADMIN — METOPROLOL TARTRATE 25 MG: 25 TABLET, FILM COATED ORAL at 08:55

## 2017-05-04 RX ADMIN — INSULIN ASPART 4 UNITS: 100 INJECTION, SOLUTION INTRAVENOUS; SUBCUTANEOUS at 13:06

## 2017-05-04 RX ADMIN — BISACODYL 10 MG: 10 SUPPOSITORY RECTAL at 13:16

## 2017-05-04 RX ADMIN — HYDROMORPHONE HYDROCHLORIDE 4 MG: 2 TABLET ORAL at 08:56

## 2017-05-04 RX ADMIN — LEVOTHYROXINE SODIUM 100 MCG: 100 TABLET ORAL at 08:56

## 2017-05-04 RX ADMIN — INSULIN GLARGINE 35 UNITS: 100 INJECTION, SOLUTION SUBCUTANEOUS at 10:48

## 2017-05-04 NOTE — PROGRESS NOTES
History:   Minimal c/o.  Pain control on oral meds Tolerating full liquid diet, has a poor appetite..  Preop symptoms improving.  Ambulating in room yesterday.  Vitals:   B/P: 156/59, T: 99.4, P: Data Unavailable, R: 16    Intake/Output Summary (Last 24 hours) at 05/04/17 0908  Last data filed at 05/04/17 0600   Gross per 24 hour   Intake           2037.5 ml   Output             1455 ml   Net            582.5 ml          Lab Results   Component Value Date     05/03/2017    Lab Results   Component Value Date    CHLORIDE 109 05/03/2017    Lab Results   Component Value Date    BUN 21 05/03/2017      Lab Results   Component Value Date    POTASSIUM 4.6 05/03/2017    POTASSIUM 4.2 05/02/2017    Lab Results   Component Value Date    CO2 30 05/03/2017    Lab Results   Component Value Date    CR 1.11 05/03/2017        Lab Results   Component Value Date    HGB 11.1 (L) 05/03/2017        Results for orders placed or performed during the hospital encounter of 05/02/17 (from the past 24 hour(s))   Glucose by meter   Result Value Ref Range    Glucose 169 (H) 70 - 99 mg/dL   Glucose by meter   Result Value Ref Range    Glucose 113 (H) 70 - 99 mg/dL   Glucose by meter   Result Value Ref Range    Glucose 109 (H) 70 - 99 mg/dL   Glucose by meter   Result Value Ref Range    Glucose 121 (H) 70 - 99 mg/dL   Glucose by meter   Result Value Ref Range    Glucose 104 (H) 70 - 99 mg/dL     DALY output 10 cc since MN.  Dressing:   Dry and intact. Brace/collar intact and well fitted.   Neuro:   Motor: 4/5 in the left deltoid and biceps otherwise 5/5 in upper and lower extremities  Sensation: Decreased globally in upper extremities, patient feels this is better than preop     A/P:   Stable POD # two   Hemovac and change dressing today.  Will encourage p.o. intake and start protein supplements t.i.d.  Transfer to [facility] tomorrow.

## 2017-05-04 NOTE — PLAN OF CARE
Problem: Goal Outcome Summary  Goal: Goal Outcome Summary  PT: Pt sitting up in chair upon arrival, agreeable to PT. Pt on 3L O2 via NC, SpO2 92% at rest, 88-90% with activity. Pt transfers sit>stand to FWW with Ann-Marie of 1. Pt requires Ann-Marie for toilet transfer and maxA for clothing management/cares. Pt ambulates 80' with FWW and CGA. Pt demonstrates very little motion at knee during amb, shortened shuffled steps bilaterally, flexed posture. Fair improvement with cues. Recommend pt discharge to ARU vs TCU.

## 2017-05-04 NOTE — PLAN OF CARE
"Problem: Goal Outcome Summary  Goal: Goal Outcome Summary  OT: Pt sitting in chair eating breakfast at time of attempt for OT eval. Despite encouragement and education on OT role and possible ARU at discharge, pt declined OT eval and intervention. Per pt, \"I want to go to TCU tomorrow. That is the plan that I made with the doctor and I wish everyone would just leave that be.\" Pt declined OT during hospitalization. Per chart, pt discharging to TCU tomorrow; thus, defer OT to TCU setting.       "

## 2017-05-04 NOTE — PLAN OF CARE
Problem: Goal Outcome Summary  Goal: Goal Outcome Summary  Outcome: Improving  A&Ox4, VSS on 1LNC. Baseline numbness & tingling to fingertips & toes. Dressing CDI, DALY x1 w/ minimal OP. Soft collar in place. Hypo BS, denies passing gas. Denies nausea. A1/commode, voiding adequate amounts. Takes dilaudid for pain w/adequate relief. Prefers pills crushed w/applesauce. DC to good [facility] TCU on Friday.

## 2017-05-04 NOTE — PROGRESS NOTES
[facility]    Hospitalist Progress Note    Date of Service (when I saw the patient): 05/04/2017    Assessment & Plan   Nevaeh Lopes is a 76-year-old female with past medical history of diabetes mellitus type 2, hypertension, hyperlipidemia, obstructive sleep apnea, does not use CPAP, anxiety, depression and cervical spinal stenosis who underwent previously scheduled C4 through C6 anterior cervical fusion per Dr. Martin. Hospitalist Service was consulted postoperatively for co-medical management.     Cervical spinal stenosis.   - s/p fusion of C4 through C6 on 05/02/2017. Surgery was performed using general anesthesia. EBL was 100.  - Routine post-operative care, including pain mgt and DVT prophylaxis, per surgical team.   - Note: on 5/4 patient denies pain but complains that she is lilia sleepy all the time. We had a long discussion re: the dangers of combining her PTA ativan and dilaudid. PTA ativan changed to PRN (only taking 0.5 mg daily with 0.5 mg as needed) and decreased her dilaudid dose as she denies significant pain.   - If pain is not controlled on this dose, I would increase it back to 2-4 mg at less frequent intervals and decrease ativan to 0.25 mg PRN.     Diabetes mellitus type 2, HgbA1c was 7.2 on 04/25/2017. - continues on PTA lantus 35 units.   - takes sliding scale at home with meals, continued on PTA lantus with high SSI QID    Recent Labs  Lab 05/04/17  0716 05/04/17  0207 05/03/17  2101 05/03/17  1732 05/03/17  1216 05/03/17  0847 05/03/17  0802  05/02/17  0715   GLC  --   --   --   --   --  146*  --   --  164*   * 121* 109* 113* 169*  --  134*  < >  --    < > = values in this interval not displayed.    Hypertension.   - Continue PTA hydralazine, metoprolol and torsemide with hold parameters.   - PTA Torsemide and potassium resumed on 5/3.   - BMP ordered for AM.   - Adequately controlled on 5/4.     Gastroesophageal reflux disease.   - Continue PTA  omeprazole.      Hypothyroidism.   - Continue PTA levothyroxine.     Depression.   - Continue PTA fluoxetine.     Chronic kidney disease, stage III. Pre-op 1.40 which appears to be her baseline.     Recent Labs  Lab 05/03/17  0847   CR 1.11*   - Cr improved from baseline  - BMP ordered for AM.     Obstructive sleep apnea. The patient does not use CPAP at baseline. However, did have intermittent difficulty breathing postoperatively, improved with DuoNeb treatment. She can have supplemental oxygen as needed overnight to assist with saturations and she also will have albuterol p.r.n. for any further wheezing or difficulty breathing.     Anxiety   - Resumed PTA ativan daily with PRN @ HS on 5/3. See above, changed to PRN on 5/4.     Deep venous thrombosis prophylaxis. PCDs as per primary service.     Amy Terrell  [phone] (p)  Text Page (7AM - 6PM)     Interval History   She denies pain but states she feels sleepy. She can't stay awake. No SOB, N/V.     -Data reviewed today: I reviewed all new labs and imaging results over the last 24 hours. I personally reviewed no images or EKG's today.    Physical Exam   Temp: 99.4  F (37.4  C) Temp src: Oral BP: 156/59   Heart Rate: 81 Resp: 16 SpO2: 98 % O2 Device: Nasal cannula Oxygen Delivery: 2 LPM  Vitals:    05/02/17 0717   Weight: 96.2 kg (212 lb)     Vital Signs with Ranges  Temp:  [98.2  F (36.8  C)-99.7  F (37.6  C)] 99.4  F (37.4  C)  Heart Rate:  [66-81] 81  Resp:  [16-18] 16  BP: (139-175)/(48-64) 156/59  SpO2:  [93 %-100 %] 98 %  I/O last 3 completed shifts:  In: 2037.5 [P.O.:350; I.V.:1687.5]  Out: 1455 [Urine:1400; Drains:55]    Constitutional: Appears comfortable, NAD,   Neuropsyche:  alert and oriented, answers questions appropriately.   HEENT: Neck brace in place.   Respiratory:  Breathing comfortably, good air exchange, no wheezes, no crackles.   Cardiovascular:  Regular rate and rhythm, no edema.  GI:  soft, NT/ND, BS normal  Skin/Integumen:  No acute rash or sign of  bleeding.     Medications   All medications reviewed on 05/04/17     NaCl 75 mL/hr at 05/04/17 0600       LORazepam (ATIVAN) tablet 0.5 mg  0.5 mg Oral Daily     fexofenadine (ALLEGRA) tablet 180 mg  180 mg Oral Daily     FLUoxetine (PROzac) capsule 40 mg  40 mg Oral Every Other Day     FLUoxetine (PROzac) capsule 80 mg  80 mg Oral Every Other Day     hydrALAZINE (APRESOLINE) tablet 100 mg  100 mg Oral TID     levothyroxine (SYNTHROID/LEVOTHROID) tablet 100 mcg  100 mcg Oral Daily     metoprolol (LOPRESSOR) tablet 25 mg  25 mg Oral BID     omeprazole (priLOSEC) CR capsule 20 mg  20 mg Oral Daily     polyethylene glycol  17 g Oral Daily     potassium chloride SA (K-DUR/KLOR-CON M) CR tablet 20 mEq  20 mEq Oral BID w/meals     pravastatin (PRAVACHOL) tablet 20 mg  20 mg Oral At Bedtime     torsemide (DEMADEX) tablet 40 mg  40 mg Oral BID     cholecalciferol  5,000 Units Oral Daily     sodium chloride (PF)  3 mL Intracatheter Q8H     insulin aspart  1-10 Units Subcutaneous TID AC     insulin aspart  1-7 Units Subcutaneous At Bedtime     insulin glargine  35 Units Subcutaneous QAM       Data     Recent Labs  Lab 05/03/17  0847 05/02/17  0715   HGB 11.1*  --      --    POTASSIUM 4.6 4.2   CHLORIDE 109  --    CO2 30  --    BUN 21  --    CR 1.11*  --    ANIONGAP 5  --    ZARA 8.1*  --    * 164*       No results found for this or any previous visit (from the past 24 hour(s)).    '

## 2017-05-04 NOTE — PROGRESS NOTES
Social Work Progress Note  Pt chart reviewed. Pt discussed in interdisciplinary rounds.     Intervention: Sw received a call from admissions requesting pt's referral. Sw faxed over the referral via discharge on the double. Sw later spoke with Angela at [facility] and she confirmed that they are able to accept pt tomorrow. Sw to confirm transport time with pt and update Angela.     Plan:  Anticipated Discharge Placement: [facility]   Barriers: None identified at this time.   Follow-up plan: Sw to continue to follow.    CONNER Grant, BREA  [phone]    ADDENDUM:   I/P: Sw met with pt and she explained that a friend will transport her tomorrow at d/c. Pt will let sw know when she knows what time her friend could transport her tomorrow. Pt may need 02 at d/c because she is currently on 2L NC.     PAS-RR    D: Per DHS regulation, SW completed and submitted PAS-RR to [place] [employer] Direct Connect via the Senior LinkAge Line.  PAS-RR confirmation # is : [id number]    P: Further questions may be directed to Senior LinkAge Line at #[phone], option #4 for PAS-RR staff.      CONNER Grant, BREA  [phone]

## 2017-05-04 NOTE — PLAN OF CARE
Problem: Goal Outcome Summary  Goal: Goal Outcome Summary  Outcome: No Change  A&O x4. Numbness and tingling to prakash fingers and toes, CMS otherwise intact. Bowel sounds hypoactive, no flatus present. VSS. Dressing CDI, soft collar in place. DALY w/ 20cc bloody/bright drainage. Up with 1, GB and W to BSC, encouraged ambulation. C/o mild pain, decreased with PO dilaudid. Plan PT/OT, nrsng cont to monitor.

## 2017-05-04 NOTE — PLAN OF CARE
Problem: Goal Outcome Summary  Goal: Goal Outcome Summary  PT: On first attempt to see pt for PM PT session, pt working on BM in bathroom, requesting for more time.      On second attempt, pt busy with cares. Encouraged pt to ambulate with nursing staff at least 1 more time today.

## 2017-05-04 NOTE — PLAN OF CARE
Problem: Goal Outcome Summary  Goal: Goal Outcome Summary  Outcome: Improving  A&O. CMS intact except baseline n/t in fingers and toes. Bowel sounds active, passing flatus, suppository given-awaiting results. DD3 diet. Pt on 2L O2, VSS. Dressing changed, incision WDL. DALY d/c'd. Up with SBA, GB, walker. Soft collar in place. C/o minimal pain, decreased with dilaudid x1. Plan d/c to [facility] tomorrow.

## 2017-05-05 ENCOUNTER — APPOINTMENT (OUTPATIENT)
Dept: SPEECH THERAPY | Facility: CLINIC | Age: 77
DRG: 472 | End: 2017-05-05
Attending: ORTHOPAEDIC SURGERY
Payer: MEDICARE

## 2017-05-05 ENCOUNTER — APPOINTMENT (OUTPATIENT)
Dept: PHYSICAL THERAPY | Facility: CLINIC | Age: 77
DRG: 472 | End: 2017-05-05
Attending: ORTHOPAEDIC SURGERY
Payer: MEDICARE

## 2017-05-05 ENCOUNTER — APPOINTMENT (OUTPATIENT)
Dept: GENERAL RADIOLOGY | Facility: CLINIC | Age: 77
DRG: 472 | End: 2017-05-05
Attending: INTERNAL MEDICINE
Payer: MEDICARE

## 2017-05-05 LAB
ANION GAP SERPL CALCULATED.3IONS-SCNC: 9 MMOL/L (ref 3–14)
BUN SERPL-MCNC: 27 MG/DL (ref 7–30)
CALCIUM SERPL-MCNC: 8.3 MG/DL (ref 8.5–10.1)
CHLORIDE SERPL-SCNC: 105 MMOL/L (ref 94–109)
CO2 SERPL-SCNC: 29 MMOL/L (ref 20–32)
CREAT SERPL-MCNC: 1.1 MG/DL (ref 0.52–1.04)
GFR SERPL CREATININE-BSD FRML MDRD: 48 ML/MIN/1.7M2
GLUCOSE BLDC GLUCOMTR-MCNC: 136 MG/DL (ref 70–99)
GLUCOSE BLDC GLUCOMTR-MCNC: 140 MG/DL (ref 70–99)
GLUCOSE BLDC GLUCOMTR-MCNC: 144 MG/DL (ref 70–99)
GLUCOSE BLDC GLUCOMTR-MCNC: 155 MG/DL (ref 70–99)
GLUCOSE BLDC GLUCOMTR-MCNC: 158 MG/DL (ref 70–99)
GLUCOSE BLDC GLUCOMTR-MCNC: 162 MG/DL (ref 70–99)
GLUCOSE SERPL-MCNC: 160 MG/DL (ref 70–99)
POTASSIUM SERPL-SCNC: 4 MMOL/L (ref 3.4–5.3)
SODIUM SERPL-SCNC: 143 MMOL/L (ref 133–144)

## 2017-05-05 PROCEDURE — 80048 BASIC METABOLIC PNL TOTAL CA: CPT | Performed by: INTERNAL MEDICINE

## 2017-05-05 PROCEDURE — 92526 ORAL FUNCTION THERAPY: CPT | Mod: GN | Performed by: SPEECH-LANGUAGE PATHOLOGIST

## 2017-05-05 PROCEDURE — 25000132 ZZH RX MED GY IP 250 OP 250 PS 637: Mod: GY | Performed by: PHYSICIAN ASSISTANT

## 2017-05-05 PROCEDURE — 40000193 ZZH STATISTIC PT WARD VISIT: Performed by: PHYSICAL THERAPIST

## 2017-05-05 PROCEDURE — A9270 NON-COVERED ITEM OR SERVICE: HCPCS | Mod: GY | Performed by: INTERNAL MEDICINE

## 2017-05-05 PROCEDURE — 99233 SBSQ HOSP IP/OBS HIGH 50: CPT | Performed by: INTERNAL MEDICINE

## 2017-05-05 PROCEDURE — 00000146 ZZHCL STATISTIC GLUCOSE BY METER IP

## 2017-05-05 PROCEDURE — 92610 EVALUATE SWALLOWING FUNCTION: CPT | Mod: GN | Performed by: SPEECH-LANGUAGE PATHOLOGIST

## 2017-05-05 PROCEDURE — 97530 THERAPEUTIC ACTIVITIES: CPT | Mod: GP | Performed by: PHYSICAL THERAPIST

## 2017-05-05 PROCEDURE — 99207 ZZC CDG-MDM COMPONENT: MEETS MODERATE - UP CODED: CPT | Performed by: INTERNAL MEDICINE

## 2017-05-05 PROCEDURE — 12000007 ZZH R&B INTERMEDIATE

## 2017-05-05 PROCEDURE — 71010 XR CHEST PORT 1 VW: CPT

## 2017-05-05 PROCEDURE — 25000132 ZZH RX MED GY IP 250 OP 250 PS 637: Mod: GY | Performed by: INTERNAL MEDICINE

## 2017-05-05 PROCEDURE — 25000132 ZZH RX MED GY IP 250 OP 250 PS 637: Mod: GY | Performed by: ORTHOPAEDIC SURGERY

## 2017-05-05 PROCEDURE — 97116 GAIT TRAINING THERAPY: CPT | Mod: GP | Performed by: PHYSICAL THERAPIST

## 2017-05-05 PROCEDURE — A9270 NON-COVERED ITEM OR SERVICE: HCPCS | Mod: GY | Performed by: ORTHOPAEDIC SURGERY

## 2017-05-05 PROCEDURE — 25000128 H RX IP 250 OP 636: Performed by: INTERNAL MEDICINE

## 2017-05-05 PROCEDURE — A9270 NON-COVERED ITEM OR SERVICE: HCPCS | Mod: GY | Performed by: PHYSICIAN ASSISTANT

## 2017-05-05 PROCEDURE — 36416 COLLJ CAPILLARY BLOOD SPEC: CPT | Performed by: INTERNAL MEDICINE

## 2017-05-05 PROCEDURE — 40000225 ZZH STATISTIC SLP WARD VISIT: Performed by: SPEECH-LANGUAGE PATHOLOGIST

## 2017-05-05 PROCEDURE — 25000131 ZZH RX MED GY IP 250 OP 636 PS 637: Mod: GY | Performed by: ORTHOPAEDIC SURGERY

## 2017-05-05 RX ORDER — HYDROMORPHONE HYDROCHLORIDE 2 MG/1
1-2 TABLET ORAL EVERY 4 HOURS PRN
Qty: 30 TABLET | Refills: 0 | Status: SHIPPED | OUTPATIENT
Start: 2017-05-05

## 2017-05-05 RX ORDER — FUROSEMIDE 10 MG/ML
40 INJECTION INTRAMUSCULAR; INTRAVENOUS
Status: DISCONTINUED | OUTPATIENT
Start: 2017-05-05 | End: 2017-05-06

## 2017-05-05 RX ADMIN — INSULIN ASPART 1 UNITS: 100 INJECTION, SOLUTION INTRAVENOUS; SUBCUTANEOUS at 14:12

## 2017-05-05 RX ADMIN — FLUOXETINE 80 MG: 20 CAPSULE ORAL at 08:05

## 2017-05-05 RX ADMIN — HYDROMORPHONE HYDROCHLORIDE 1 MG: 2 TABLET ORAL at 04:40

## 2017-05-05 RX ADMIN — INSULIN ASPART 1 UNITS: 100 INJECTION, SOLUTION INTRAVENOUS; SUBCUTANEOUS at 18:32

## 2017-05-05 RX ADMIN — LORAZEPAM 0.5 MG: 0.5 TABLET ORAL at 21:07

## 2017-05-05 RX ADMIN — METOPROLOL TARTRATE 25 MG: 25 TABLET, FILM COATED ORAL at 21:02

## 2017-05-05 RX ADMIN — HYDROMORPHONE HYDROCHLORIDE 1 MG: 2 TABLET ORAL at 11:24

## 2017-05-05 RX ADMIN — HYDROMORPHONE HYDROCHLORIDE 1 MG: 2 TABLET ORAL at 15:32

## 2017-05-05 RX ADMIN — HYDRALAZINE HYDROCHLORIDE 100 MG: 50 TABLET ORAL at 21:02

## 2017-05-05 RX ADMIN — ACETAMINOPHEN 650 MG: 325 TABLET, FILM COATED ORAL at 21:04

## 2017-05-05 RX ADMIN — FUROSEMIDE 40 MG: 10 INJECTION, SOLUTION INTRAVENOUS at 11:58

## 2017-05-05 RX ADMIN — FUROSEMIDE 40 MG: 10 INJECTION, SOLUTION INTRAVENOUS at 15:32

## 2017-05-05 RX ADMIN — POTASSIUM CHLORIDE 20 MEQ: 1500 TABLET, EXTENDED RELEASE ORAL at 08:05

## 2017-05-05 RX ADMIN — CHOLECALCIFEROL CAP 125 MCG (5000 UNIT) 5000 UNITS: 125 CAP at 08:05

## 2017-05-05 RX ADMIN — HYDROMORPHONE HYDROCHLORIDE 1 MG: 2 TABLET ORAL at 18:33

## 2017-05-05 RX ADMIN — INSULIN ASPART 1 UNITS: 100 INJECTION, SOLUTION INTRAVENOUS; SUBCUTANEOUS at 08:04

## 2017-05-05 RX ADMIN — PRAVASTATIN SODIUM 20 MG: 20 TABLET ORAL at 21:02

## 2017-05-05 RX ADMIN — TORSEMIDE 40 MG: 10 TABLET ORAL at 08:05

## 2017-05-05 RX ADMIN — HYDROMORPHONE HYDROCHLORIDE 1 MG: 2 TABLET ORAL at 00:11

## 2017-05-05 RX ADMIN — HYDROMORPHONE HYDROCHLORIDE 1 MG: 2 TABLET ORAL at 08:05

## 2017-05-05 RX ADMIN — INSULIN GLARGINE 35 UNITS: 100 INJECTION, SOLUTION SUBCUTANEOUS at 10:03

## 2017-05-05 RX ADMIN — ACETAMINOPHEN 650 MG: 325 TABLET, FILM COATED ORAL at 15:40

## 2017-05-05 RX ADMIN — FEXOFENADINE HYDROCHLORIDE 180 MG: 180 TABLET, FILM COATED ORAL at 08:05

## 2017-05-05 RX ADMIN — POTASSIUM CHLORIDE 20 MEQ: 1500 TABLET, EXTENDED RELEASE ORAL at 18:33

## 2017-05-05 RX ADMIN — LEVOTHYROXINE SODIUM 100 MCG: 100 TABLET ORAL at 08:05

## 2017-05-05 RX ADMIN — HYDRALAZINE HYDROCHLORIDE 100 MG: 50 TABLET ORAL at 15:32

## 2017-05-05 RX ADMIN — HYDRALAZINE HYDROCHLORIDE 100 MG: 50 TABLET ORAL at 08:05

## 2017-05-05 RX ADMIN — BISACODYL 10 MG: 10 SUPPOSITORY RECTAL at 11:24

## 2017-05-05 RX ADMIN — POLYETHYLENE GLYCOL 3350 17 G: 17 POWDER, FOR SOLUTION ORAL at 08:04

## 2017-05-05 RX ADMIN — METOPROLOL TARTRATE 25 MG: 25 TABLET, FILM COATED ORAL at 08:05

## 2017-05-05 NOTE — PLAN OF CARE
Problem: Goal Outcome Summary  Goal: Goal Outcome Summary  PT: Pt working with speech therapy at time of afternoon PT session.     Addendum: On second attempt, pt sitting up in chair, agreeable to PT. Pt on 1L O2 via NC with SpO2 94% and HR 70 at rest. Pt on 1L O2 for activity. Pt requires Ann-Marie for sit<>stand and toilet transfers as pt has difficulty pushing from chair d/t BUE weakness. Pt ambulates 140' with FWW and SBA. After activity: SpO2 86% and HR 90. Pt able to recover to >90% on 1L with short seated rest. Encouraged pt to ambulate again this evening with nursing staff. Recommend discharge to TCU.

## 2017-05-05 NOTE — PLAN OF CARE
Problem: Goal Outcome Summary  Goal: Goal Outcome Summary  Outcome: Improving  Pt a/ox4 with VS, runs HTN and using 2l o2 to maintain sats. CMS with N/t in BUE at BL. 4/5 strength, pt states much improved since OR. Ant cspine dressing CDI with soft collar in place. Up in room with SBA and GB. BG covered. Refused most PO, only liquids. BS + and passing flatus, states no BM yet. DD3 diet with thin. Plan for d/c to TCU 5/5.

## 2017-05-05 NOTE — PROGRESS NOTES
"   05/05/17 1445   General Information   Onset Date 05/02/17   Start of Care Date 05/05/17   Referring Physician Dr. Martin   Patient Profile Review/OT: Additional Occupational Profile Info See Profile for full history and prior level of function   Patient/Family Goals Statement To stop \"choking\" while eating   Swallowing Evaluation Bedside swallow evaluation   Behaviorial Observations WFL (within functional limits)   Mode of current nutrition Oral diet   Type of oral diet Dysphagia diet level 3;Thin liquid   Respiratory Status O2 Supply   Type of O2 supply Nasal cannula   Comments SLP: This 76 year old female was referred to ST with swallowing difficulties noted by RN, \"Pt started having difficulty swallowing this morning. Only taking small sips of water and coughing.\" RN and PT reported immediate coughing noted with water. Pt underwent s/p fusion of c4 through c6 on 5/2/17. Pt progressed to dysphagia diet level 3 with thin liquids and was discharging today to TCU prior to difficulty swallowing. Past medical history includes: GERD, diabetes mellitus type 2, hypertension, hyperlipidemia, obstructive sleep apnea, does not use CPAP, anxiety, depression. Soft collar currently in place. Chest x-ray ordered due to pt unable to wean off oxygen and 88% on room air.    Clinical Swallow Evaluation   Oral Musculature generally intact   Structural Abnormalities none present   Dentition present and adequate   Mucosal Quality good   Mandibular Strength and Mobility intact   Oral Labial Strength and Mobility WFL   Lingual Strength and Mobility WFL   Velar Elevation other (see comments)  (Unable to visualize)   Buccal Strength and Mobility intact   Laryngeal Function Cough;Throat clear   Additional Documentation Yes   Clinical Swallow Eval: Thin Liquid Texture Trial   Mode of Presentation, Thin Liquids straw;cup   Oral Phase of Swallow Premature pharyngeal entry  (suspected)   Pharyngeal Phase of Swallow coughing/choking;feeling of " something stuck in throat;throat clearing   Diagnostic Statement Immediate cough noted with thin water via cup and straw. Vocal quality remained clear.    Clinical Swallow Eval: Nectar Thick Liquid Texture Trial   Mode of Presentation, Nectar spoon   Oral Phase, Nectar WFL   Pharyngeal Phase, [place] intact   Diagnostic Statement No overt s/sx of aspiration noted with sip size controlled with teaspoon.    Clinical Swallow Eval: Puree Solid Texture Trial   Mode of Presentation, Puree spoon;self-fed   Oral Phase, Puree Poor AP movement   Pharyngeal Phase, Puree coughing/choking;feeling of something stuck in throat;regurgitation of food/liquids   Diagnostic Statement Poor AP movement noted likely due to pt reporting fear of swallowing. Cough with regurgitation noted on small 1/4 tsp bite.    Esophageal Phase of Swallow   Patient reports or presents with symptoms of esophageal dysphagia Yes  (GERD history)   Swallow Eval: Clinical Impressions   Skilled Criteria for Therapy Intervention Skilled criteria met.  Treatment indicated.   Functional Assessment Scale (FAS) 3   Treatment Diagnosis Oropharyngeal dysphagia   Diet texture recommendations Full liquid;Nectar thick liquids   Recommended Feeding/Eating Techniques hard swallow w/ each bite or sip;maintain upright posture during/after eating for 30 mins;no straws;small sips/bites  (Via teaspoon only)   Therapy Frequency daily   Predicted Duration of Therapy Intervention (days/wks) 1 week   Anticipated Discharge Disposition extended care facility   Risks and Benefits of Treatment have been explained. Yes   Patient, family and/or staff in agreement with Plan of Care Yes   Clinical Impression Comments SLP: Bedside swallow evaluation performed revealing moderate oropharyngeal dysphagia likely due to swelling associated with c4 through c6 fusion. Vocal quality noted to be WFL with pt reporting mildly lower voice, but not abnormal for her. Oral motor exam WFL with adequate  "strength and ROM. Swallow unable to be visualized or palpitated due to collar. Pt reported puree solids getting, \"stuck\" and regurgitated. This was observed with   tsp bite of applesauce. Thin liquids trialed with immediate cough and pt reporting feeling difficulty breathing. Strong cough noted. Nectar thick liquids via spoon trialed with pt reporting improvement in swallow with no overt s/sx of aspiration noted. Ice chips via spoon trialed with adequate mastication and no overt s/sx of aspiration. Recommended: full liquid, nectar thick diet, liquids via teaspoon to slow rate and manage sip size, GERD precautions including upright with all intake and 30 minutes after, meds crushed in nectar thick liquids. ST to follow tomorrow for diet tolerance and upgrade as appropriate. Pt to DC to TCU with further ST pending pt progress.    Total Evaluation Time   Total Evaluation Time (Minutes) 23     "

## 2017-05-05 NOTE — PLAN OF CARE
Problem: Goal Outcome Summary  Goal: Goal Outcome Summary  Outcome: No Change  A&O x4. Has BUE/BLE numbness/tingling at baseline, other CMS intact. Bowel sounds active, passing gas. VSS, satting in mid 90's on 2L O2. Dressing CDI, soft collar on. Up with 1, walker and gait belt. Voiding well in bathroom. Neck pain treated with PO dilaudid x2. Plan to DC to [facility] today, will continue to monitor.

## 2017-05-05 NOTE — DISCHARGE SUMMARY
DATE OF ADMISSION:  05/02/2017      DATE OF DISCHARGE:  05/05/2017      DIAGNOSIS:  Cervical myelopathy and cervical disk degeneration.      PROCEDURE THIS ADMISSION:  C5 corpectomy, C4-C6 anterior spinal fusion using intervertebral prosthesis, local autograft and instrumentation.      HISTORY OF PRESENT ILLNESS:  Ms. Nevaeh Lopes is a 76-year-old woman who has a history of bilateral upper extremity numbness, weakness and incoordination.  Her preoperative evaluation revealed severe central stenosis at the C4-C5 and C5-C6 levels.  Her preoperative physical examination showed numbness in bilateral upper extremities and signs of myelopathy.  Because of the severe stenosis in her spinal canal and her early symptoms, it was recommended that she undergo the previously mentioned surgical procedure.      HOSPITAL COURSE:  On the day of admission, she was taken to the operating room where she underwent the previously mentioned procedures, tolerated the procedures without difficulty and was returned to regular hospital julio postoperatively.  Her postoperative course was unremarkable.  She made good progress in eating, voiding and ambulating.  She was accomplished using these activities with a walker and assistance at the time of discharge.  It was felt that she would benefit from a short-term nursing home stay.  At the time of discharge, her hemoglobin was stable.  She was eating and voiding without difficulty.  At the time of this dictation she had not yet had a bowel movement, but this was anticipated prior to discharge.  She was transferred to the rehab center on postoperative day #3.      DISCHARGE MEDICATIONS:   1.  Dilaudid 2 mg tablets 1-2 tablets p.o. q.4-6 hours p.r.n. for pain.   2.  Vitamin D3 at 5000 international units p.o. each day.   3.  Plain Tylenol 1300 mg p.o. twice a day p.r.n.   4.  Albuterol inhaler 2 puffs in the lungs every 4 hours as needed.   5.  Aspirin 325 mg p.o. at bedtime.   6.  Allegra 180  mg p.o. each day.   7.  Prozac 40 mg p.o. every other day on even days and Prozac 80 mg p.o. each day on odd days.   8.  Hydralazine 100 mg p.o. 3 times a day.   9.  Insulin NovoLog FlexPen 100 units/mL injections 7-12 units subcutaneous 3 times a day with meals.   10.  Sliding scale insulin.   11.  Lantus 35 units subcutaneous every morning.   12.  Synthroid 100 mcg p.o. each day.   13.  Imodium 2 mg p.o. q.4 h. as needed.   14.  Lorazepam 0.5 mg p.o. each day at bedtime p.r.n.   15.  Melatonin 10 mg p.o. each day at bedtime.   16.  Metoprolol tartrate 25 mg p.o. twice a day.   17.  Omeprazole 20 mg p.o. each day.   18.  MiraLax 17 g by mouth p.o. each day p.r.n.   19.  Potassium chloride 20 mEq p.o. twice a day.   20.  Mirapex 0.5 mg p.o. each day at bedtime p.r.n.   21.  Pravastatin 20 mg p.o. each day.   22.  Torsemide 40 mg p.o. twice a day.   23.  IBgard 1-2 tablets p.o. 3 times a day as needed for IBS.      She will follow up in my clinic 6 weeks from the time of discharge.  She was placed into a soft cervical collar postoperatively and will wear this until her followup appointment.  She can remove it for showers.  She will have physical, occupational and speech therapy while in the rehab center.         KIMBERLY NEWBERRY MD             D: 2017 08:18   T: 2017 10:10   MT: shade      Name:     ALBINA SAXENA   MRN:      [id number]        Account:        [id number]   :      1940           Admit Date:                                       Discharge Date:       Document: [id number]       cc: Bartolome Clark MD

## 2017-05-05 NOTE — PLAN OF CARE
Problem: Goal Outcome Summary  Goal: Goal Outcome Summary  Outcome: No Change  A/O. AVSS except BP slightly elevated 140s-160s and unable to wean off 2L NC. Chest Xray done, started on lasix. CMS intact except numbness and tingling into all extremities. Dressing CDI. Soft collar on at all times. Pain controlled with prn dilaudid. Minimal appetite on DD3 with thin liquids, started experiencing some swallowing difficulties this AM- MD paged, Taking pills crushed in applesauce. Suppository given this AM with +results. Vdg- on strict I&O. Up with assist of 1, walker and belt. Discharge pending respiratory progress.     3-7 PM Addendum: SLP consult ordered. Diet changed to full liquid nectar consistency, swallowing improved with new diet. Tmax 100.3, tylenol given, IS encouraged. New dressing applied.

## 2017-05-05 NOTE — PROGRESS NOTES
History:   Minimal c/o.  Ambulated in hallway yesterday Tolerating DD3 diet without difficulty.  Preop symptoms improved. No BM yet.  Vitals:   B/P: 155/59, T: 99.4, P: Data Unavailable, R: 14    Intake/Output Summary (Last 24 hours) at 05/05/17 0802  Last data filed at 05/04/17 1800   Gross per 24 hour   Intake              540 ml   Output                0 ml   Net              540 ml          Lab Results   Component Value Date     05/03/2017    Lab Results   Component Value Date    CHLORIDE 109 05/03/2017    Lab Results   Component Value Date    BUN 21 05/03/2017      Lab Results   Component Value Date    POTASSIUM 4.6 05/03/2017    POTASSIUM 4.2 05/02/2017    Lab Results   Component Value Date    CO2 30 05/03/2017    Lab Results   Component Value Date    CR 1.11 05/03/2017        Lab Results   Component Value Date    HGB 11.1 (L) 05/03/2017        Results for orders placed or performed during the hospital encounter of 05/02/17 (from the past 24 hour(s))   Glucose by meter   Result Value Ref Range    Glucose 219 (H) 70 - 99 mg/dL   Glucose by meter   Result Value Ref Range    Glucose 234 (H) 70 - 99 mg/dL   Glucose by meter   Result Value Ref Range    Glucose 170 (H) 70 - 99 mg/dL   Glucose by meter   Result Value Ref Range    Glucose 158 (H) 70 - 99 mg/dL   Glucose by meter   Result Value Ref Range    Glucose 155 (H) 70 - 99 mg/dL       Dressing:   Dry and intact. Brace/collar intact and well fitted.   Neuro:   Motor: 5/5   Sensation: decreased in bilateral ues as preop.      A/P:   Stable POD # 3   To NH today   Summary dictated

## 2017-05-05 NOTE — PROVIDER NOTIFICATION
"Dr. Mario mast, \"Unable to wean off of oxygen despite encouraging activity and incentive spirometer. 88% on room air without activity and 84% with activity. Hospitalist is addressing and ordered a chest xray. They might want to hold discharge. Pt started having difficulty swallowing this morning. Only taking small sips of water and coughing. Would you like Speech to see patient before she discharges? Thanks.\" Awaiting return call.   "

## 2017-05-05 NOTE — PROGRESS NOTES
[facility]    Hospitalist Progress Note    Date of Service (when I saw the patient): 05/05/2017    Assessment & Plan   Nevaeh Lopes is a 76-year-old female with past medical history of diabetes mellitus type 2, hypertension, hyperlipidemia, obstructive sleep apnea, does not use CPAP, anxiety, depression and cervical spinal stenosis who underwent previously scheduled C4 through C6 anterior cervical fusion per Dr. Martin. Hospitalist Service was consulted postoperatively for co-medical management.     Cervical spinal stenosis.   - s/p fusion of C4 through C6 on 05/02/2017. Surgery was performed using general anesthesia. EBL was 100.  - Routine post-operative care, including pain mgt and DVT prophylaxis, per surgical team.   - On 5/4 very sleepy. We discussed dangers of combining her PTA ativan and dilaudid. PTA ativan changed to PRN (only taking 0.5 mg daily with 0.5 mg as needed) and decreased her dilaudid dose to 1-2 mg and currently only using 1 mg with pain controlled.     Acute Post-operative Hypoxia, Suspect Post-op Fluid Overload Pulmonary Edema - No known h/o CHF but chronic SHAHEEN, on 40 mg PO torsemide BID. On 5/5 not able to wean O2 going down to 86%. CXR reviewed by myself reveals pulmonary vascular congestions and and some mild interstitial edema. Obesity and decreased mobility likely contributes as well.   - Hold torsemide > start lasx 40 mg IV BID, continue PTA potassium 20 meq BID.   - Follow daily weights and I&O's. Can place murillo if unable to track strict UOP.   - Hold on discharge.   - Ambulate, IS.   - Echo as outpatient if not done within the last year.     Diabetes mellitus type 2, HgbA1c was 7.2 on 04/25/2017.   - continues on PTA lantus 35 units.   - takes sliding scale at home with meals, continued on PTA lantus with high SSI QID    Recent Labs  Lab 05/05/17  1233 05/05/17  0810 05/05/17  0755 05/05/17  0215 05/04/17  2048 05/04/17  1707 05/04/17  1235  05/03/17  0847   05/02/17  0715   GLC  --  160*  --   --   --   --   --   --  146*  --  164*   *  --  155* 158* 170* 234* 219*  < >  --   < >  --    < > = values in this interval not displayed.    Hypertension.   - Continue PTA hydralazine, metoprolol and torsemide with hold parameters.   - PTA Torsemide and potassium resumed on 5/3. > changed to IV lasix on 5/5 as above.   - Adequately controlled on 5/4.     Gastroesophageal reflux disease.   - Continue PTA  omeprazole.     Hypothyroidism.   - Continue PTA levothyroxine.     Depression.   - Continue PTA fluoxetine.     Chronic kidney disease, stage III. Pre-op 1.40 which appears to be her baseline.     Recent Labs  Lab 05/05/17  0810 05/03/17  0847   CR 1.10* 1.11*   - Cr improved from baseline  - BMP ordered daily with attempt at diuresis.      Obstructive sleep apnea. The patient does not use CPAP at baseline. However, did have intermittent difficulty breathing postoperatively, improved with DuoNeb treatment. She can have supplemental oxygen as needed overnight to assist with saturations and she also will have albuterol p.r.n. for any further wheezing or difficulty breathing.     Anxiety   - Resumed PTA ativan daily with PRN @ HS on 5/3. See above, changed to PRN on 5/4.     Deep venous thrombosis prophylaxis. PCDs as per primary service.     Amyeliecer Terrell  [phone] (p)  Text Page (7AM - 6PM)     Interval History   Unable to wean off oxygen, down to 84% on RA. Otherwise, no acute SOB or chest pain. Chronic swelling unchnaged from baseline. No weights since admission. No fevers, cough. CXR obtained as above.     -Data reviewed today: I reviewed all new labs and imaging results over the last 24 hours. I personally reviewed the chest x-ray image(s) showing as above. .    Physical Exam   Temp: 98.7  F (37.1  C) Temp src: Oral BP: 161/57   Heart Rate: 72 Resp: 16 SpO2: 91 % O2 Device: Nasal cannula Oxygen Delivery: 1 LPM  Vitals:    05/02/17 0717   Weight: 96.2 kg (212  lb)     Vital Signs with Ranges  Temp:  [97.9  F (36.6  C)-99.4  F (37.4  C)] 98.7  F (37.1  C)  Heart Rate:  [61-76] 72  Resp:  [14-16] 16  BP: (140-170)/(47-64) 161/57  SpO2:  [84 %-96 %] 91 %  I/O last 3 completed shifts:  In: 540 [P.O.:540]  Out: -     Constitutional: Appears comfortable, NAD, neck brace   Neuropsyche:  alert and oriented, answers questions appropriately.   HEENT: Neck brace in place.   Respiratory:  Breathing comfortably, good air exchange, no wheezes, minimal crackles in the lower lung fields.    Cardiovascular:  Regular rate and rhythm, 1-2+ edema.  GI:  soft, NT/ND, BS normal  Skin/Integumen:  No acute rash or sign of bleeding.     Medications   All medications reviewed on 05/05/17        furosemide  40 mg Intravenous BID     fexofenadine (ALLEGRA) tablet 180 mg  180 mg Oral Daily     FLUoxetine (PROzac) capsule 40 mg  40 mg Oral Every Other Day     FLUoxetine (PROzac) capsule 80 mg  80 mg Oral Every Other Day     hydrALAZINE (APRESOLINE) tablet 100 mg  100 mg Oral TID     levothyroxine (SYNTHROID/LEVOTHROID) tablet 100 mcg  100 mcg Oral Daily     metoprolol (LOPRESSOR) tablet 25 mg  25 mg Oral BID     omeprazole (priLOSEC) CR capsule 20 mg  20 mg Oral Daily     polyethylene glycol  17 g Oral Daily     potassium chloride SA (K-DUR/KLOR-CON M) CR tablet 20 mEq  20 mEq Oral BID w/meals     pravastatin (PRAVACHOL) tablet 20 mg  20 mg Oral At Bedtime     cholecalciferol  5,000 Units Oral Daily     sodium chloride (PF)  3 mL Intracatheter Q8H     insulin aspart  1-10 Units Subcutaneous TID AC     insulin aspart  1-7 Units Subcutaneous At Bedtime     insulin glargine  35 Units Subcutaneous QAM       Data     Recent Labs  Lab 05/05/17  0810 05/03/17  0847 05/02/17  0715   HGB  --  11.1*  --     144  --    POTASSIUM 4.0 4.6 4.2   CHLORIDE 105 109  --    CO2 29 30  --    BUN 27 21  --    CR 1.10* 1.11*  --    ANIONGAP 9 5  --    ZARA 8.3* 8.1*  --    * 146* 164*       Recent Results (from  the past 24 hour(s))   XR Chest Port 1 View    Narrative    XR CHEST PORT 1 VW  5/5/2017 11:08 AM     HISTORY:  hypoxia, post-op    COMPARISON: None.    FINDINGS:  Cardiac silhouette is prominent. Mild pulmonary vascular  congestion. Mild patchy opacities in the lung bases. Bilateral  shoulder arthroplasties.      Impression    IMPRESSION: Cardiomegaly, pulmonary vascular congestion. This could be  due to mild congestive failure or mild fluid overload.    SALLIE DON MD       '

## 2017-05-05 NOTE — PLAN OF CARE
Problem: Goal Outcome Summary  Goal: Goal Outcome Summary  PT: Pt sitting up in chair upon arrival, agreeable to PT. Pt on 1L O2 via NC with SpO2 96%, HR 60s. On RA at rest, SpO2 93%, HR 61. Pt requires Ann-Marie for sit<>stand transfers and toilet transfers to FWW, maxA for clothing management and cares. Pt ambulates 100' with FWW and CGA. Noting improved gait speed and step length today, however, gait distance limited by nausea. After activity on RA, SpO2 84%, HR 70s; pt required 2 min seated rest plus 1L O2 via NC to increased SpO2 to >90%. Pt denies SOB, does appear slightly LOPEZ/wheezy. Encouraged use of IS. Encouraged frequent amb with nursing staff. Recommend pt discharge to TCU.

## 2017-05-05 NOTE — PLAN OF CARE
Problem: Goal Outcome Summary  Goal: Goal Outcome Summary  SLP: Bedside swallow evaluation performed revealing moderate oropharyngeal dysphagia likely due to swelling associated with c4 through c6 fusion. Vocal quality noted to be WFL with pt reporting mildly lower voice, but not abnormal for her. Oral motor exam WFL with adequate strength and ROM. Swallow unable to be visualized or palpitated due to collar. Pt reported puree solids getting,  stuck  and regurgitated. This was observed with   tsp bite of applesauce. Thin liquids trialed with immediate cough and pt reporting feeling difficulty breathing. Strong cough noted. Nectar thick liquids via spoon trialed with pt reporting improvement in swallow with no overt s/sx of aspiration noted. Ice chips via spoon trialed with adequate mastication and no overt s/sx of aspiration. Recommended: full liquid, nectar thick diet, liquids via teaspoon to slow rate and manage sip size, ice chips ok, GERD precautions including upright with all intake and 30 minutes after, meds crushed in nectar thick liquids. ST to follow tomorrow for diet tolerance and upgrade as appropriate. Pt to DC to TCU with further ST pending pt progress.

## 2017-05-05 NOTE — PROGRESS NOTES
Social Work Progress Note  Pt chart reviewed. Pt discussed in interdisciplinary rounds.     Intervention: Pt d/c was cancelled today due to medical course. Pt to tentatively d/c tomorrow to [facility]. [facility] admissions was updated of the above.     Plan:  Anticipated Discharge Placement: [facility].   Follow-up plan: Sw to continue to follow.     CONNER Grant, [facility]  [phone]

## 2017-05-06 ENCOUNTER — APPOINTMENT (OUTPATIENT)
Dept: PHYSICAL THERAPY | Facility: CLINIC | Age: 77
DRG: 472 | End: 2017-05-06
Attending: ORTHOPAEDIC SURGERY
Payer: MEDICARE

## 2017-05-06 ENCOUNTER — APPOINTMENT (OUTPATIENT)
Dept: SPEECH THERAPY | Facility: CLINIC | Age: 77
DRG: 472 | End: 2017-05-06
Attending: ORTHOPAEDIC SURGERY
Payer: MEDICARE

## 2017-05-06 VITALS
SYSTOLIC BLOOD PRESSURE: 156 MMHG | HEIGHT: 63 IN | TEMPERATURE: 98.3 F | BODY MASS INDEX: 37.27 KG/M2 | RESPIRATION RATE: 16 BRPM | DIASTOLIC BLOOD PRESSURE: 55 MMHG | OXYGEN SATURATION: 96 % | WEIGHT: 210.32 LBS

## 2017-05-06 LAB
ANION GAP SERPL CALCULATED.3IONS-SCNC: 9 MMOL/L (ref 3–14)
BUN SERPL-MCNC: 28 MG/DL (ref 7–30)
CALCIUM SERPL-MCNC: 8.5 MG/DL (ref 8.5–10.1)
CHLORIDE SERPL-SCNC: 104 MMOL/L (ref 94–109)
CO2 SERPL-SCNC: 30 MMOL/L (ref 20–32)
CREAT SERPL-MCNC: 1.07 MG/DL (ref 0.52–1.04)
GFR SERPL CREATININE-BSD FRML MDRD: 50 ML/MIN/1.7M2
GLUCOSE BLDC GLUCOMTR-MCNC: 118 MG/DL (ref 70–99)
GLUCOSE BLDC GLUCOMTR-MCNC: 197 MG/DL (ref 70–99)
GLUCOSE SERPL-MCNC: 227 MG/DL (ref 70–99)
POTASSIUM SERPL-SCNC: 3.7 MMOL/L (ref 3.4–5.3)
SODIUM SERPL-SCNC: 143 MMOL/L (ref 133–144)

## 2017-05-06 PROCEDURE — 00000146 ZZHCL STATISTIC GLUCOSE BY METER IP

## 2017-05-06 PROCEDURE — 92526 ORAL FUNCTION THERAPY: CPT | Mod: GN | Performed by: SPEECH-LANGUAGE PATHOLOGIST

## 2017-05-06 PROCEDURE — 40000225 ZZH STATISTIC SLP WARD VISIT: Performed by: SPEECH-LANGUAGE PATHOLOGIST

## 2017-05-06 PROCEDURE — A9270 NON-COVERED ITEM OR SERVICE: HCPCS | Mod: GY | Performed by: INTERNAL MEDICINE

## 2017-05-06 PROCEDURE — 97530 THERAPEUTIC ACTIVITIES: CPT | Mod: GP

## 2017-05-06 PROCEDURE — 97110 THERAPEUTIC EXERCISES: CPT | Mod: GP

## 2017-05-06 PROCEDURE — 40000193 ZZH STATISTIC PT WARD VISIT

## 2017-05-06 PROCEDURE — 25000132 ZZH RX MED GY IP 250 OP 250 PS 637: Mod: GY | Performed by: ORTHOPAEDIC SURGERY

## 2017-05-06 PROCEDURE — 25000131 ZZH RX MED GY IP 250 OP 636 PS 637: Mod: GY | Performed by: ORTHOPAEDIC SURGERY

## 2017-05-06 PROCEDURE — 25000128 H RX IP 250 OP 636: Performed by: INTERNAL MEDICINE

## 2017-05-06 PROCEDURE — 25000132 ZZH RX MED GY IP 250 OP 250 PS 637: Mod: GY | Performed by: INTERNAL MEDICINE

## 2017-05-06 PROCEDURE — 99239 HOSP IP/OBS DSCHRG MGMT >30: CPT | Performed by: INTERNAL MEDICINE

## 2017-05-06 PROCEDURE — 80048 BASIC METABOLIC PNL TOTAL CA: CPT | Performed by: INTERNAL MEDICINE

## 2017-05-06 PROCEDURE — A9270 NON-COVERED ITEM OR SERVICE: HCPCS | Mod: GY | Performed by: ORTHOPAEDIC SURGERY

## 2017-05-06 PROCEDURE — 97116 GAIT TRAINING THERAPY: CPT | Mod: GP

## 2017-05-06 PROCEDURE — 36415 COLL VENOUS BLD VENIPUNCTURE: CPT | Performed by: INTERNAL MEDICINE

## 2017-05-06 RX ORDER — CLOBETASOL PROPIONATE 0.5 MG/G
CREAM TOPICAL 2 TIMES DAILY
Status: DISCONTINUED | OUTPATIENT
Start: 2017-05-06 | End: 2017-05-06 | Stop reason: HOSPADM

## 2017-05-06 RX ORDER — CLOBETASOL PROPIONATE 0.5 MG/G
CREAM TOPICAL 2 TIMES DAILY
DISCHARGE
Start: 2017-05-06

## 2017-05-06 RX ORDER — LORAZEPAM 0.5 MG/1
0.5 TABLET ORAL DAILY PRN
Qty: 20 TABLET | Status: SHIPPED | DISCHARGE
Start: 2017-05-06

## 2017-05-06 RX ADMIN — FUROSEMIDE 40 MG: 10 INJECTION, SOLUTION INTRAVENOUS at 09:29

## 2017-05-06 RX ADMIN — HYDROMORPHONE HYDROCHLORIDE 1 MG: 2 TABLET ORAL at 15:49

## 2017-05-06 RX ADMIN — CHOLECALCIFEROL CAP 125 MCG (5000 UNIT) 5000 UNITS: 125 CAP at 09:29

## 2017-05-06 RX ADMIN — HYDRALAZINE HYDROCHLORIDE 100 MG: 50 TABLET ORAL at 15:49

## 2017-05-06 RX ADMIN — HYDROMORPHONE HYDROCHLORIDE 1 MG: 2 TABLET ORAL at 03:12

## 2017-05-06 RX ADMIN — HYDRALAZINE HYDROCHLORIDE 100 MG: 50 TABLET ORAL at 09:29

## 2017-05-06 RX ADMIN — POTASSIUM CHLORIDE 20 MEQ: 1500 TABLET, EXTENDED RELEASE ORAL at 09:28

## 2017-05-06 RX ADMIN — LORAZEPAM 0.5 MG: 0.5 TABLET ORAL at 05:24

## 2017-05-06 RX ADMIN — INSULIN ASPART 3 UNITS: 100 INJECTION, SOLUTION INTRAVENOUS; SUBCUTANEOUS at 09:59

## 2017-05-06 RX ADMIN — METOPROLOL TARTRATE 25 MG: 25 TABLET, FILM COATED ORAL at 09:28

## 2017-05-06 RX ADMIN — POLYETHYLENE GLYCOL 3350 17 G: 17 POWDER, FOR SOLUTION ORAL at 09:28

## 2017-05-06 RX ADMIN — FLUOXETINE 40 MG: 20 CAPSULE ORAL at 09:29

## 2017-05-06 RX ADMIN — HYDROMORPHONE HYDROCHLORIDE 1 MG: 2 TABLET ORAL at 09:29

## 2017-05-06 RX ADMIN — FEXOFENADINE HYDROCHLORIDE 180 MG: 180 TABLET, FILM COATED ORAL at 09:29

## 2017-05-06 RX ADMIN — ACETAMINOPHEN 650 MG: 325 TABLET, FILM COATED ORAL at 06:19

## 2017-05-06 RX ADMIN — INSULIN GLARGINE 35 UNITS: 100 INJECTION, SOLUTION SUBCUTANEOUS at 09:59

## 2017-05-06 RX ADMIN — LEVOTHYROXINE SODIUM 100 MCG: 100 TABLET ORAL at 09:28

## 2017-05-06 NOTE — PLAN OF CARE
Problem: Goal Outcome Summary  Goal: Goal Outcome Summary  SLP: Swallow tx completed this AM. Pt taking meds crushed in puree texture without difficulty upon SLP arrival. Pt wearing soft C-collar; unable to manually palpate swallow 2/2 brace. Pt demonstrated no overt s/sx of aspiration w/ ice chips, nectar-thick liquids, puree, or semi-solids. Mastication adequate. She denied feeling of pharyngeal stasis and no regurgitation noted. Pt with inconsistent cough and throat clear after small sips of thin liquids. Pt remains at increased risk of aspiration at this time. Recommend initiate dysphagia diet level 2, continue nectar-thick liquids. Recommend up in chair for PO, small bites/sips, alternate solids/liquids, remain upright 30 min after meals, slow pace, and regular oral cares. Meds crushed in puree texture. Pt okay for free water protocol: upright and alert, wait 30 min after meals, complete excellent oral cares prior, small single sips via thin water. Please hold PO if note s/sx of aspiration or pt has feeling of food sticking in throat/regurgitation. Recommend discharge to TCU w/ ongoing SLP services. SLP will continue to follow to assess tolerance/appropriateness for diet upgrade.

## 2017-05-06 NOTE — PLAN OF CARE
Problem: Goal Outcome Summary  Goal: Goal Outcome Summary  Outcome: No Change  A/O. AVSS except BP slightly elevated 140s-160s and weaned off oxygen. CMS intact except numbness and tingling into all extremities. Dressing CDI. Soft collar on at all times. Pain controlled with prn dilaudid. Fair appetite on DD2 with nectar thickened liquids, taking pills crushed in applesauce. +bowel sounds. Vdg- on strict I&O. Up with assist of 1, walker and belt. Discharge planned to [facility] at 1700.     Addendum: discharged to [facility]. Education reviewed. Sent with belongings.

## 2017-05-06 NOTE — DISCHARGE INSTRUCTIONS
Pt to d/c to [facility] today at 1645 via son.   Encourage use of her CPAP machine at night.     INSTRUCTIONS FOR CERVICAL SURGICAL PATIENTS  KIMBERLY MARTIN MD--[facility]    POSTOPERATIVE INSTRUCTIONS (AFTER SURGERY):     PATIENTS WITH A SOFT COLLAR:    1. The collar must be worn at all times for the 6 weeks after your surgery until your first follow up appointment with Dr. Martin. You may only remove it to shower however, be sure to avoid any excessive movement of your head and neck.      PATIENTS WITH A CUSTOM NECK BRACE:  1. The brace must be worn at all times for 3 months after surgery. In most cases you can remove it to shower. Dr. Martin will tell you if you cannot.     2. Check the straps on the brace daily to make sure the lines drawn on the straps are where they belong and the brace is fitting properly.    3. Regarding any questions, concerns or needed adjustments with the brace please call [facility], [phone], and speak directly to the orthotist that fit the brace. If you are having difficulty reaching them please call [facility] at [phone].    4. This brace can be cumbersome to wear we encourage you to practice wearing it with all activities BEFORE your surgery. This will allow you to get used to how it will feel and make any modifications at home or work if need be.     INCISION/WOUND CARE                1.   If you are discharged from the hospital with a dressing over your incision you may remove and replace it 2 days after leaving the hospital.     2.   If you have sutures that dissolve, the incision must be covered when showering for 5 days after surgery. On the 6th day, you may take a shower normally without covering the wound. But do not scrub the wound.     3.  The small pieces of tape over your incision are called Steri-strips, they can be   removed when they are loose or after 2 weeks.                                                                            4.    If you have staples, your incision must remain dry and covered until they are removed 2 weeks after your surgery. Please call Dr. Mario Rogers s Care Coordinator at [phone] to make an appointment to have these removed when you have returned home from the hospital.    5. Please look at your incisions daily and call [phone] immediately if you notice any redness, swelling, drainage, or if you develop a fever greater than 101. If after hours or weekends call [phone] and speak to the on-call physician.    6. Absolutely no bathtubs, hot tubs, swimming in pools or lakes, or any submersion/soaking before the incision is completely healed (no open areas or scabs are present).    7. Due to the location of the incision and surgery you may experience swelling that can alter your swallowing if you are having any difficulty at all please contact us immediately [phone] or after hours or weekends call [phone] and speak to the on-call physician.    POST OPERATIVE ACTIVITY LIMITATIONS    1. Avoid extreme motions of your head and neck which will be limited/controlled by the collar. Prior to surgery we encourage you to stock straws for drinking and soft foods to eat post-operatively as eating/drinking will be temporarily effected by both post surgical swelling and the collar.     2. No lifting over 10 pounds (gallon of milk) above your chest or below your waist.     3. Avoid repetitive twisting or bending i.e. raking, sweeping, shoveling etc.    4. Walking stimulates the healing process. Dr. Martin wants you to accomplish a minimum of 45 minutes of sustained walking per day for exercise. You are encouraged to walk several times a day and there is no limit on how far you can walk. In the beginning you may only be able to walk 5-15 minutes at a time that is okay just do this a minimum of 4-10x/day.    5. You may remove your white stockings (todd hose) for   hour twice a day. You may discontinue  wearing the stockings when you are not spending any time in bed during the day and are walking at least hourly.    6. You may begin driving again when you are no longer taking the narcotic pain medication and feel comfortable with being able to navigate amongst other drivers. You must also wear the collar that you have been provided while driving.    7. You may return to work when you are no longer taking the narcotic pain medication. You must observe the 10-pound lifting restriction (nothing below waist or above chest), frequent change of position from sit, stand, and walking and avoid repetitive bending and twisting. You must also wear the collar that you have been provided.    8. Advancement of physical activities will be discussed at each follow up appointment with Dr. Martin. Physical therapy, if needed, will also be discussed at each appointment.    9. Gentle sexual activity is okay. Be a passive participant.    POST OPERATIVE MEDICATIONS    1. If your surgery INCLUDED A FUSION  DO NOT take Ibuprofen, Motrin, Advil, Aleve or any other anti-inflammatory medication or aspirin products for 3 months after surgery. This also includes any medication taken for chronic inflammatory conditions i.e. Methotrexate, Remicaid, Prednisone etc. unless otherwise instructed. Only Tylenol or Tylenol based medications are okay during this time frame.    2. If your surgery DID NOT include a fusion you may resume any over-the-counter     anti-inflammatories (Advil, Ibuprofen, Naproxen etc.) 3-5 days after the surgery.    3. We recommend three 8 ounce glasses of milk daily and a daily supplement of Vitamin D 2000 i.u./day for fusion healing and bone growth.    4. Poor nutrition can lead to delayed wound healing and constipation. Good sources of lean proteins and fresh fruits and vegetables will help with this.     5. Narcotic pain medications will also cause constipation. Using over the counter stool softeners, drinking plenty of  fluids, ambulation, and good nutrition can help prevent this from occurring.     If you have any questions regarding these instructions please contact Dr. Martin at   [phone].

## 2017-05-06 NOTE — DISCHARGE SUMMARY
[facility]    Discharge Summary  Hospitalist    Please also see Discharge Summary by Surgical Team    Date of Admission:  5/2/2017  Date of Discharge:  5/6/2017  Discharging Provider: Amy Terrell  Date of Service (when I saw the patient): 05/06/17    History of Present Illness   Nevaeh Lopes is a 76-year-old female with past medical history of diabetes mellitus type 2, hypertension, hyperlipidemia, obstructive sleep apnea, does not use CPAP, anxiety, depression and cervical spinal stenosis who underwent previously scheduled C4 through C6 anterior cervical fusion per Dr. Martin. Hospitalist Service was consulted postoperatively for co-medical management.     Hospital Course      Organized by Discharge Diagnosis  Nevaeh Lopes was admitted on 5/2/2017.  The following problems were addressed during her hospitalization:    Cervical spinal stenosis.   - s/p fusion of C4 through C6 on 05/02/2017. Surgery was performed using general anesthesia. EBL was 100.  - Routine post-operative care, including pain mgt and DVT prophylaxis, per surgical team.   - On 5/4 very sleepy. We discussed dangers of combining her PTA ativan and dilaudid. PTA ativan changed to PRN (PTA taking 0.5 mg daily with 0.5 mg q HS as needed) and decreased her dilaudid dose to 1-2 mg. Pain was controlled on this lower dose.      Acute Post-operative Hypoxia, Suspect Post-op Fluid Overload Pulmonary Edema - No known h/o CHF but chronic SHAHEEN, on 40 mg PO torsemide BID. On 5/5 not able to wean O2 going down to 86%. CXR reviewed by myself reveals pulmonary vascular congestions and and some mild interstitial edema. Obesity and decreased mobility likely contributes as well.   - Held torsemide on 5/4 > started lasx 40 mg IV BID, continued PTA potassium 20 meq BID.   - Diuresed and saturating 95% on RA at discharge. K 3.7, Cr 1.07 at discharge.   - Echo recommended as outpatient if not done within the last year. Discussed with patient.      Obstructive sleep apnea. Encouraged use of CPAP, tolerates for a couple hours a night currently. Discussed how this contributes to above.      Diabetes mellitus type 2, HgbA1c was 7.2 on 04/25/2017.   - continued on PTA lantus 35 units.   - takes sliding scale at home with meals, continued on PTA lantus with high SSI QID       Recent Labs  Lab 05/06/17  0856 05/06/17  0702 05/05/17  2323 05/05/17  2203 05/05/17  1825 05/05/17  1233 05/05/17  0810 05/05/17  0755  05/03/17  0847  05/02/17  0715   GLC  --  227*  --   --   --   --  160*  --   --  146*  --  164*   *  --  140* 136* 144* 162*  --  155*  < >  --   < >  --    < > = values in this interval not displayed.     Hypertension.   - Continue PTA hydralazine, metoprolol and torsemide at discharge.   - BP up a bit at discharge. Follow up as outpatient. If remains elevated, consider increase in torsemide or hydralazine, depending on fluid status.      Gastroesophageal reflux disease.   - Continue PTA omeprazole.      Hypothyroidism.   - Continue PTA levothyroxine.      Depression.   - Continue PTA fluoxetine.      Chronic kidney disease, stage III. Pre-op 1.40 which appears to be her baseline.      Recent Labs  Lab 05/05/17  0810 05/03/17  0847   CR 1.10* 1.11*   - Cr improved from baseline  - BMP ordered daily with attempt at diuresis.          Anxiety   - Resumed PTA ativan daily with PRN @ HS on 5/3. See above, changed to PRN on 5/4.     Rash - She has a small patch of what appears to be psoriasis on left upper arm. It is itchy.   - Prescribed clobetasol 0.05% cream to apply twice daily until resolves.     Amy Terrell    Significant Results and Procedures   S/p fusion of C4 through C6 on 05/02/2017.     Pending Results   None   Unresulted Labs Ordered in the Past 30 Days of this Admission     No orders found from 3/3/2017 to 5/3/2017.          Code Status   Full Code       Primary Care Physician   Bartolome Clark    Physical Exam   Temp: 98.1  F (36.7   C) Temp src: Oral BP: 159/68   Heart Rate: 62 Resp: 16 SpO2: 95 % O2 Device: None (Room air) Oxygen Delivery: 1 LPM  Vitals:    05/02/17 0717 05/06/17 0526   Weight: 96.2 kg (212 lb) 95.4 kg (210 lb 5.1 oz)     Vital Signs with Ranges  Temp:  [98.1  F (36.7  C)-100.3  F (37.9  C)] 98.1  F (36.7  C)  Heart Rate:  [62-67] 62  Resp:  [16] 16  BP: (146-167)/(55-68) 159/68  SpO2:  [93 %-96 %] 95 %  I/O last 3 completed shifts:  In: 795 [P.O.:795]  Out: 1300 [Urine:1300]    Constitutional:  NAD,   Neuropsyche:  alert and oriented, answers questions appropriately.   Respiratory:  Breathing comfortably, good air exchange, no wheezes, no crackles.   Cardiovascular:  Regular rate and rhythm, 1-2+ edema.  GI:  soft, NT/ND, BS normal  Skin/Integumen:  No acute rash, bruising or sign of bleeding. ~ 2.5 cm erythematous patch with scale on left upper arm, just above elbow.     Discharge Disposition   Discharged to short-term care facility  Condition at discharge: Good    Consultations This Hospital Stay   OCCUPATIONAL THERAPY ADULT IP CONSULT  PHYSICAL THERAPY ADULT IP CONSULT  HOSPITALIST IP CONSULT  SOCIAL WORK IP CONSULT  PHYSICAL THERAPY ADULT IP CONSULT  OCCUPATIONAL THERAPY ADULT IP CONSULT  SPEECH LANGUAGE PATH ADULT IP CONSULT  SWALLOW EVAL SPEECH PATH AT BEDSIDE IP CONSULT    Time Spent on this Encounter   I, Amy Terrell, personally saw the patient today and spent greater than 30 minutes discharging this patient.    Discharge Orders     Mantoux instructions   Give two-step Mantoux (PPD) Per Facility Policy Yes     Reason for your hospital stay   See discharge summary     Glucose monitor nursing POCT   Before meals and at bedtime     Wound care (specify)   Site:   Anterior neck  Instructions:  Keep wound dry for two more days. May remove steri strips in one week.     Activity - Up with assistive device     Activity - Up with nursing assistance     Activity - Ambulate in hallway   Every shift     General info for SNF    Length of Stay Estimate: Short Term Care: Estimated # of Days <30  Condition at Discharge: Improving  Level of care:skilled   Rehabilitation Potential: Good  Admission H&P remains valid and up-to-date: Yes  Recent Chemotherapy: N/A  Use Nursing Home Standing Orders: Yes     Follow Up (Transitional Care Management)   Follow up with primary care provider, Bartolome Clark, or TCU provider within 7 days for hospital follow- up.  The following labs/tests are recommended: BMP.  Echocardiogram recommended after discharge from TCU, prior to follow up with her primary care provider.     Full Code     Physical Therapy Adult Consult   Evaluate and treat as clinically indicated.    Reason:  Post op anterior cervical fusion for myelopathy. No neck exercises. rom of bilateral ues.     Occupational Therapy Adult Consult   Evaluate and treat as clinically indicated.    Reason:  adls     Speech Language Path Adult Consult   Evaluate and treat as clinically indicated.    Reason:  Anterior cervical surgery.     Fall precautions     Advance Diet as Tolerated   Follow this diet upon discharge: Orders Placed This Encounter     Snacks/Supplements Adult: Ensure Plus Adult Shake; Between Meals     Advance Diet as Tolerated: Regular Diet Adult; Dysphagia Diet Level 3: Advanced; Thin Liquids (water, ice chips, juice, milk gelatin, ice cream, etc)       Discharge Medications   Current Discharge Medication List      START taking these medications    Details   clobetasol (TEMOVATE) 0.05 % cream Apply topically 2 times daily    Associated Diagnoses: Psoriasis      HYDROmorphone (DILAUDID) 2 MG tablet Take 0.5-1 tablets (1-2 mg) by mouth every 4 hours as needed for moderate to severe pain  Qty: 30 tablet, Refills: 0    Associated Diagnoses: Cervical disc disease with myelopathy         CONTINUE these medications which have CHANGED    Details   !! LORazepam (ATIVAN) 0.5 MG tablet Take 1 tablet (0.5 mg) by mouth daily as needed  Qty: 20 tablet     Associated Diagnoses: JOSE (generalized anxiety disorder)       !! - Potential duplicate medications found. Please discuss with provider.      CONTINUE these medications which have NOT CHANGED    Details   !! Acetaminophen (TYLENOL PO) Take 1,300 mg by mouth daily as needed for mild pain or fever      VITAMIN D, CHOLECALCIFEROL, PO Take 5,000 Units by mouth daily (takes 5 x 1000 unit tablet = 5000unit dose)      !! Acetaminophen (TYLENOL PO) Take 1,300 mg by mouth 2 times daily       albuterol (PROAIR HFA/PROVENTIL HFA/VENTOLIN HFA) 108 (90 BASE) MCG/ACT Inhaler Inhale 2 puffs into the lungs every 4 hours as needed for shortness of breath / dyspnea or wheezing      ASPIRIN PO Take 325 mg by mouth At Bedtime      Fexofenadine HCl (ALLEGRA PO) Take 180 mg by mouth daily      !! FLUoxetine HCl (PROZAC PO) Take 40 mg by mouth every other day On Even Days      !! FLUoxetine HCl (PROZAC PO) Take 80 mg by mouth every other day On Odd Days (Takes 2 x 40mg tablet = 80mg dose)      HYDRALAZINE HCL PO Take 100 mg by mouth 3 times daily      insulin aspart (NOVOLOG FLEXPEN) 100 UNIT/ML injection Inject 7-12 Units Subcutaneous 3 times daily (with meals) If BG is over 170 - sliding scale      insulin glargine (LANTUS) 100 UNIT/ML injection Inject 35 Units Subcutaneous every morning      LEVOTHYROXINE SODIUM PO Take 100 mcg by mouth daily      Loperamide HCl (IMODIUM A-D PO) Take 2 mg by mouth every 4 hours as needed      !! LORazepam (ATIVAN PO) Take 0.5 mg by mouth nightly as needed for anxiety       MELATONIN PO Take 10 mg by mouth At Bedtime (takes 2 x 5mg tablet = 10mg dose)      METOPROLOL TARTRATE PO Take 25 mg by mouth 2 times daily      OMEPRAZOLE PO Take 20 mg by mouth daily      Polyethylene Glycol 3350 (MIRALAX PO) Take 17 g by mouth daily as needed      Potassium Chloride Crissy CR (K-DUR PO) Take 20 mEq by mouth 2 times daily (Takes 2 x 10mEq = 20mEq dose)      Pramipexole Dihydrochloride (MIRAPEX PO) Take 0.5 mg by  mouth nightly as needed (restless legs)      PRAVASTATIN SODIUM PO Take 20 mg by mouth At Bedtime      TORSEMIDE PO Take 40 mg by mouth 2 times daily (Takes 2 x 20mg tablet = 40mg dose)      NONFORMULARY Take 1-2 capsules by mouth 3 times daily as needed (Irritable Bowel Syndrome) OTC: IBgard       !! - Potential duplicate medications found. Please discuss with provider.      STOP taking these medications       acetaminophen-codeine (TYLENOL #3) 300-30 MG per tablet Comments:   Reason for Stopping:             Allergies   Allergies   Allergen Reactions     Adhesive Tape Other (See Comments)     Burning---paper tape OK     Animal Dander      congestion     Dust Mites Other (See Comments)     Runny nose     Nsaids Other (See Comments)     Edema     Oxycodone       hallucinations     Sulfa Drugs Hives     Clindamycin Rash     Data     IMAGING RESULTS FROM THIS HOSPITAL STAY:  Results for orders placed or performed during the hospital encounter of 05/02/17   XR Cervical Spine Port 1 View    Narrative    XR CERVICAL SPINE PORT 1 VW 5/2/2017 10:10 AM    COMPARISON: 4/20/2017    HISTORY: Intraoperative during cervical spinal surgery.      Impression    IMPRESSION: Surgical hardware at the C5-6 level. No fractures are  seen.    BETTE DAWSON   XR Cervical Spine Port 1 View    Narrative    CERVICAL SPINE PORTABLE ONE VIEW   5/2/2017 12:13 PM     HISTORY: Intraoperative film.    COMPARISON: Intraoperative views same day.      Impression    IMPRESSION: Anterior cervical fusion involving C4, C5, and C6 with  interbody prostheses. Alignment appears anatomic. Endotracheal tube  remains in place.    SONU LEON MD   XR Chest Port 1 View    Narrative    XR CHEST PORT 1 VW  5/5/2017 11:08 AM     HISTORY:  hypoxia, post-op    COMPARISON: None.    FINDINGS:  Cardiac silhouette is prominent. Mild pulmonary vascular  congestion. Mild patchy opacities in the lung bases. Bilateral  shoulder arthroplasties.      Impression     IMPRESSION: Cardiomegaly, pulmonary vascular congestion. This could be  due to mild congestive failure or mild fluid overload.    SALLIE DON MD       MOST RECENT LAB RESULTS:  Most Recent 3 CBC's:  Recent Labs   Lab Test  05/03/17   0847   HGB  11.1*      Most Recent 3 BMP's:  Recent Labs   Lab Test  05/06/17   0702  05/05/17   0810  05/03/17   0847   NA  143  143  144   POTASSIUM  3.7  4.0  4.6   CHLORIDE  104  105  109   CO2  30  29  30   BUN  28  27  21   CR  1.07*  1.10*  1.11*   ANIONGAP  9  9  5   ZARA  8.5  8.3*  8.1*   GLC  227*  160*  146*

## 2017-05-06 NOTE — PLAN OF CARE
Problem: Goal Outcome Summary  Goal: Goal Outcome Summary  Outcome: Improving  A&O x 4. CMS intact except baseline numbness to hands and feet. Bowel sounds audible. VSS except needing 1 lpm O2, unable to wean, encouraged IS. Dressing CDI. Soft collar in place.  Up with A1/GB/W. Tolerating full nectar thick diet. C/o nagging neck pain, decreased with tylenol and dilaudid. Ativan given x 2 for anxiety.Plan for possible dc today to [facility] .

## 2017-05-06 NOTE — PLAN OF CARE
Problem: Goal Outcome Summary  Goal: Goal Outcome Summary  Physical Therapy Discharge Summary     Reason for therapy discharge:    Discharged to transitional care facility.     Progress towards therapy goal(s). See goals on Care Plan in [facility] electronic health record for goal details.  Goals not met.  Barriers to achieving goals:   discharge from facility.     Therapy recommendation(s):    Continued therapy is recommended.  Rationale/Recommendations:  Decreased independence with and tolerance for functional mobility.

## 2017-05-06 NOTE — PROGRESS NOTES
Social Work Progress Note  Pt chart reviewed. Pt discussed in interdisciplinary rounds.     Intervention: MD completed d/c orders which was faxed alongside scripts to [facility]. [facility] spoke with [facility] who confirmed that they are able to accept pt today. Sw met with pt and she stated that her son will transport her to [facility] around 1700 today.     Team members notified: Bedside RN, &CC    Plan:  Anticipated Discharge Placement: [facility]   Barriers: None identified at this time.   Follow-up plan: No further plans to follow. Sw available should a need arise.     Pat Nugent, [place], [facility]  [phone] [id number]

## 2017-05-06 NOTE — PLAN OF CARE
Problem: Goal Outcome Summary  Goal: Goal Outcome Summary  PT: Ambulated 10' from bed to bathroom with SBA and FWW. Ambulated 80' x2 with SBA and FWW. Patient needs 1 minute rest break inbetween walks. Supine to sit with SBA from elevated HOB. Sit to/from stand x2 with SBA. Sit to/from stand from toliet with SBA. Needs assist of another for wiping after using toliet. Sit to supine with SBA. Recommend discharge to TCU to address functional deficits in strength, balance and gait.

## 2017-05-07 NOTE — PLAN OF CARE
Problem: Goal Outcome Summary  Goal: Goal Outcome Summary  Speech Language Therapy Discharge Summary     Reason for therapy discharge:    Discharged to transitional care facility.     Progress towards therapy goal(s). See goals on Care Plan in [facility] electronic health record for goal details.  Goals partially met.  Barriers to achieving goals:   discharge from facility.     Therapy recommendation(s):    Continued therapy is recommended.  Rationale/Recommendations:  Recommend ongoing SLP services to assess tolerance of diet and appropriateness for diet upgrade.      At time of discharge, recommend continue dysphagia diet level 2 and nectar-thick liquids. Recommend up in chair for PO, small bites/sips, alternate solids/liquids, remain upright 30 min after meals, slow pace, and regular oral cares. Meds crushed in puree texture. Pt okay for free water protocol: upright and alert, wait 30 min after meals, complete excellent oral cares prior, small single sips via thin water. Please hold PO if note s/sx of aspiration or pt has feeling of food sticking in throat/regurgitation.

## 2024-06-25 NOTE — BRIEF OP NOTE
[facility] Brief Operative Note    Pre-operative diagnosis: CERVICAL SPINAL STENOSIS WITH MYELOPATHY    Post-operative diagnosis  same    Procedure:  C5 corpectomy, decompression of the spinal cord and bilateral C5 and C6 nerves.      C4-6 anterior spinal fusion using intervertebral prosthesis, local autograft and instrumentation.       Surgeon(s): Surgeon(s) and Role:     * Rambo Martin MD - Primary     * Leigh Ann Martinez PA-C - Assisting   Estimated blood loss: 100 mL    Specimens: * No specimens in log *   Findings:  Severe spinal stenosis, bilateral C5 and C6 foraminal stenosis.  Excellent bone density       Updated plan of care. Pt admit for sacrum fracture after fall at [facility]. Pt uses walker at facility. Spoke with Jessenia-niece and sister-Cate. Plan is [facility]. Pre-cert initiated but pt can discharge today-she has medicaid product. 7000 and wheelchair form done, CHELSEY initiated. Facility to transport pt at 12 noon. Updated staff and pt/family-taylor

## (undated) DEVICE — GLOVE PROTEXIS BLUE W/NEU-THERA 7.0  2D73EB70

## (undated) DEVICE — ESU GROUND PAD UNIVERSAL W/O CORD

## (undated) DEVICE — GLOVE PROTEXIS MICRO 7.0  2D73PM70

## (undated) DEVICE — WIPES FOLEY CARE SURESTEP PROVON DFC100

## (undated) DEVICE — NDL SPINAL 22GA 3.5" QUINCKE 405181

## (undated) DEVICE — STPL SKIN 35W APPOSE 8886803712

## (undated) DEVICE — DRAPE C-ARM 60X42" 1013

## (undated) DEVICE — DRAIN JACKSON PRATT RESERVOIR 100ML SU130-1305

## (undated) DEVICE — DRAPE SHEET REV FOLD 3/4 9349

## (undated) DEVICE — DRAPE STERI TOWEL SM 1000

## (undated) DEVICE — DRILL BIT MEDT ATLANTIS 2.4MM 7756010

## (undated) DEVICE — SPONGE SURGIFOAM 100 1974

## (undated) DEVICE — ESU CORD BIPOLAR 12' E0509

## (undated) DEVICE — GLOVE PROTEXIS MICRO 8.5  2D73PM85

## (undated) DEVICE — PACK UNIVERSAL SPLIT 29131

## (undated) DEVICE — TAPE DRSG UNIVERSAL CLOTH 3" WHITE LATEX 881-3

## (undated) DEVICE — ESU PENCIL W/HOLSTER E2350H

## (undated) DEVICE — DRAPE OVERHEAD TABLE

## (undated) DEVICE — SU SILK 3-0 TIE 24" SA74H

## (undated) DEVICE — MIDAS REX DISSECTING TOOL  14MH30

## (undated) DEVICE — CATH TRAY FOLEY 16FR BARDEX W/DRAIN BAG STATLOCK 300316A

## (undated) DEVICE — DRAIN JACKSON PRATT 10FR ROUND SU130-1321

## (undated) DEVICE — DRAPE POUCH INSTRUMENT 1018

## (undated) DEVICE — PREP CHLORAPREP 26ML TINTED ORANGE  260815

## (undated) DEVICE — PACK SPINE SM CUSTOM SNE15SSFSK

## (undated) DEVICE — PIN DISTRACTION ANCHOR FOR SCR 14MM MDS9091414

## (undated) DEVICE — GOWN XXL 9575

## (undated) DEVICE — ESU ELEC BLADE 2.75" COATED/INSULATED E1455

## (undated) DEVICE — DRSG GAUZE 4X4" 3033

## (undated) DEVICE — GLOVE PROTEXIS BLUE W/NEU-THERA 8.5  2D73EB85

## (undated) DEVICE — DRSG STERI STRIP 1/2X4" R1547

## (undated) DEVICE — LINEN TOWEL PACK X5 5464

## (undated) DEVICE — SUCTION FRAZIER 12FR W/HANDLE K73

## (undated) RX ORDER — HYDROMORPHONE HYDROCHLORIDE 1 MG/ML
INJECTION, SOLUTION INTRAMUSCULAR; INTRAVENOUS; SUBCUTANEOUS
Status: DISPENSED
Start: 2017-05-02

## (undated) RX ORDER — CEFAZOLIN SODIUM 2 G/100ML
INJECTION, SOLUTION INTRAVENOUS
Status: DISPENSED
Start: 2017-05-02

## (undated) RX ORDER — FENTANYL CITRATE 50 UG/ML
INJECTION, SOLUTION INTRAMUSCULAR; INTRAVENOUS
Status: DISPENSED
Start: 2017-05-02

## (undated) RX ORDER — VECURONIUM BROMIDE 1 MG/ML
INJECTION, POWDER, LYOPHILIZED, FOR SOLUTION INTRAVENOUS
Status: DISPENSED
Start: 2017-05-02

## (undated) RX ORDER — GLYCOPYRROLATE 0.2 MG/ML
INJECTION, SOLUTION INTRAMUSCULAR; INTRAVENOUS
Status: DISPENSED
Start: 2017-05-02

## (undated) RX ORDER — LIDOCAINE HYDROCHLORIDE 20 MG/ML
INJECTION, SOLUTION EPIDURAL; INFILTRATION; INTRACAUDAL; PERINEURAL
Status: DISPENSED
Start: 2017-05-02

## (undated) RX ORDER — PROPOFOL 10 MG/ML
INJECTION, EMULSION INTRAVENOUS
Status: DISPENSED
Start: 2017-05-02

## (undated) RX ORDER — IPRATROPIUM BROMIDE AND ALBUTEROL SULFATE 2.5; .5 MG/3ML; MG/3ML
SOLUTION RESPIRATORY (INHALATION)
Status: DISPENSED
Start: 2017-05-02

## (undated) RX ORDER — ONDANSETRON 2 MG/ML
INJECTION INTRAMUSCULAR; INTRAVENOUS
Status: DISPENSED
Start: 2017-05-02